# Patient Record
Sex: MALE | Race: BLACK OR AFRICAN AMERICAN | Employment: FULL TIME | URBAN - METROPOLITAN AREA
[De-identification: names, ages, dates, MRNs, and addresses within clinical notes are randomized per-mention and may not be internally consistent; named-entity substitution may affect disease eponyms.]

---

## 2017-11-18 ENCOUNTER — HOSPITAL ENCOUNTER (INPATIENT)
Age: 37
LOS: 9 days | Discharge: HOME | DRG: 286 | End: 2017-11-27
Attending: EMERGENCY MEDICINE | Admitting: INTERNAL MEDICINE
Payer: COMMERCIAL

## 2017-11-18 ENCOUNTER — APPOINTMENT (OUTPATIENT)
Dept: GENERAL RADIOLOGY | Age: 37
DRG: 286 | End: 2017-11-18
Attending: EMERGENCY MEDICINE
Payer: COMMERCIAL

## 2017-11-18 ENCOUNTER — APPOINTMENT (OUTPATIENT)
Dept: NUCLEAR MEDICINE | Age: 37
DRG: 286 | End: 2017-11-18
Attending: EMERGENCY MEDICINE
Payer: COMMERCIAL

## 2017-11-18 DIAGNOSIS — I50.9 ACUTE ON CHRONIC CONGESTIVE HEART FAILURE, UNSPECIFIED CONGESTIVE HEART FAILURE TYPE: Primary | ICD-10-CM

## 2017-11-18 PROBLEM — G47.30 SLEEP APNEA: Status: ACTIVE | Noted: 2017-11-18

## 2017-11-18 PROBLEM — R09.02 HYPOXIA: Status: ACTIVE | Noted: 2017-11-18

## 2017-11-18 PROBLEM — I10 HTN (HYPERTENSION), BENIGN: Status: ACTIVE | Noted: 2017-11-18

## 2017-11-18 PROBLEM — E78.5 HYPERLIPIDEMIA: Status: ACTIVE | Noted: 2017-11-18

## 2017-11-18 PROBLEM — R06.02 SOB (SHORTNESS OF BREATH): Status: ACTIVE | Noted: 2017-11-18

## 2017-11-18 PROBLEM — E11.29 TYPE 2 DIABETES MELLITUS WITH RENAL COMPLICATION (HCC): Status: ACTIVE | Noted: 2017-11-18

## 2017-11-18 PROBLEM — R60.0 BILATERAL LOWER EXTREMITY EDEMA: Status: ACTIVE | Noted: 2017-11-18

## 2017-11-18 LAB
ALBUMIN SERPL-MCNC: 2.8 G/DL (ref 3.5–5)
ALBUMIN/GLOB SERPL: 0.5 {RATIO} (ref 1.1–2.2)
ALP SERPL-CCNC: 82 U/L (ref 45–117)
ALT SERPL-CCNC: 15 U/L (ref 12–78)
AMPHET UR QL SCN: NEGATIVE
ANION GAP SERPL CALC-SCNC: 11 MMOL/L (ref 5–15)
AST SERPL-CCNC: 15 U/L (ref 15–37)
BARBITURATES UR QL SCN: NEGATIVE
BASOPHILS # BLD: 0 K/UL (ref 0–0.1)
BASOPHILS NFR BLD: 0 % (ref 0–1)
BENZODIAZ UR QL: NEGATIVE
BILIRUB SERPL-MCNC: 0.3 MG/DL (ref 0.2–1)
BNP SERPL-MCNC: 1867 PG/ML (ref 0–125)
BUN SERPL-MCNC: 76 MG/DL (ref 6–20)
BUN/CREAT SERPL: 37 (ref 12–20)
CALCIUM SERPL-MCNC: 8.9 MG/DL (ref 8.5–10.1)
CANNABINOIDS UR QL SCN: NEGATIVE
CHLORIDE SERPL-SCNC: 92 MMOL/L (ref 97–108)
CO2 SERPL-SCNC: 30 MMOL/L (ref 21–32)
COCAINE UR QL SCN: NEGATIVE
CREAT SERPL-MCNC: 2.08 MG/DL (ref 0.7–1.3)
D DIMER PPP FEU-MCNC: 0.73 MG/L FEU (ref 0–0.65)
DIFFERENTIAL METHOD BLD: NORMAL
DRUG SCRN COMMENT,DRGCM: NORMAL
EOSINOPHIL # BLD: 0.1 K/UL (ref 0–0.4)
EOSINOPHIL NFR BLD: 1 % (ref 0–7)
ERYTHROCYTE [DISTWIDTH] IN BLOOD BY AUTOMATED COUNT: 13.6 % (ref 11.5–14.5)
EST. AVERAGE GLUCOSE BLD GHB EST-MCNC: 278 MG/DL
GLOBULIN SER CALC-MCNC: 5.5 G/DL (ref 2–4)
GLUCOSE BLD STRIP.AUTO-MCNC: 178 MG/DL (ref 65–100)
GLUCOSE SERPL-MCNC: 384 MG/DL (ref 65–100)
HBA1C MFR BLD: 11.3 % (ref 4.2–6.3)
HCT VFR BLD AUTO: 40.1 % (ref 36.6–50.3)
HGB BLD-MCNC: 13.2 G/DL (ref 12.1–17)
LYMPHOCYTES # BLD: 1.8 K/UL (ref 0.8–3.5)
LYMPHOCYTES NFR BLD: 17 % (ref 12–49)
MCH RBC QN AUTO: 27.6 PG (ref 26–34)
MCHC RBC AUTO-ENTMCNC: 32.9 G/DL (ref 30–36.5)
MCV RBC AUTO: 83.9 FL (ref 80–99)
METHADONE UR QL: NEGATIVE
MONOCYTES # BLD: 0.7 K/UL (ref 0–1)
MONOCYTES NFR BLD: 7 % (ref 5–13)
NEUTS SEG # BLD: 8 K/UL (ref 1.8–8)
NEUTS SEG NFR BLD: 75 % (ref 32–75)
NRBC # BLD: 0.11 K/UL (ref 0–0.01)
NRBC BLD-RTO: 1.1 PER 100 WBC
OPIATES UR QL: NEGATIVE
PCP UR QL: NEGATIVE
PLATELET # BLD AUTO: 320 K/UL (ref 150–400)
POTASSIUM SERPL-SCNC: 5 MMOL/L (ref 3.5–5.1)
PROT SERPL-MCNC: 8.3 G/DL (ref 6.4–8.2)
RBC # BLD AUTO: 4.78 M/UL (ref 4.1–5.7)
RBC MORPH BLD: NORMAL
SERVICE CMNT-IMP: ABNORMAL
SODIUM SERPL-SCNC: 133 MMOL/L (ref 136–145)
TROPONIN I SERPL-MCNC: <0.04 NG/ML
TROPONIN I SERPL-MCNC: <0.04 NG/ML
WBC # BLD AUTO: 10.6 K/UL (ref 4.1–11.1)
WBC NRBC COR # BLD: ABNORMAL 10*3/UL

## 2017-11-18 PROCEDURE — 96374 THER/PROPH/DIAG INJ IV PUSH: CPT

## 2017-11-18 PROCEDURE — 80307 DRUG TEST PRSMV CHEM ANLYZR: CPT | Performed by: EMERGENCY MEDICINE

## 2017-11-18 PROCEDURE — 85025 COMPLETE CBC W/AUTO DIFF WBC: CPT | Performed by: EMERGENCY MEDICINE

## 2017-11-18 PROCEDURE — A9567 TECHNETIUM TC-99M AEROSOL: HCPCS

## 2017-11-18 PROCEDURE — 36415 COLL VENOUS BLD VENIPUNCTURE: CPT | Performed by: INTERNAL MEDICINE

## 2017-11-18 PROCEDURE — 74011250636 HC RX REV CODE- 250/636: Performed by: INTERNAL MEDICINE

## 2017-11-18 PROCEDURE — 83880 ASSAY OF NATRIURETIC PEPTIDE: CPT | Performed by: EMERGENCY MEDICINE

## 2017-11-18 PROCEDURE — 83036 HEMOGLOBIN GLYCOSYLATED A1C: CPT | Performed by: INTERNAL MEDICINE

## 2017-11-18 PROCEDURE — 74011636637 HC RX REV CODE- 636/637: Performed by: INTERNAL MEDICINE

## 2017-11-18 PROCEDURE — 99285 EMERGENCY DEPT VISIT HI MDM: CPT

## 2017-11-18 PROCEDURE — 84484 ASSAY OF TROPONIN QUANT: CPT | Performed by: EMERGENCY MEDICINE

## 2017-11-18 PROCEDURE — 82962 GLUCOSE BLOOD TEST: CPT

## 2017-11-18 PROCEDURE — 85379 FIBRIN DEGRADATION QUANT: CPT | Performed by: EMERGENCY MEDICINE

## 2017-11-18 PROCEDURE — 82803 BLOOD GASES ANY COMBINATION: CPT | Performed by: EMERGENCY MEDICINE

## 2017-11-18 PROCEDURE — 36600 WITHDRAWAL OF ARTERIAL BLOOD: CPT | Performed by: EMERGENCY MEDICINE

## 2017-11-18 PROCEDURE — 65660000000 HC RM CCU STEPDOWN

## 2017-11-18 PROCEDURE — 80053 COMPREHEN METABOLIC PANEL: CPT | Performed by: EMERGENCY MEDICINE

## 2017-11-18 PROCEDURE — 74011000250 HC RX REV CODE- 250: Performed by: INTERNAL MEDICINE

## 2017-11-18 PROCEDURE — 93005 ELECTROCARDIOGRAM TRACING: CPT

## 2017-11-18 PROCEDURE — 71010 XR CHEST PORT: CPT

## 2017-11-18 PROCEDURE — 74011250637 HC RX REV CODE- 250/637: Performed by: INTERNAL MEDICINE

## 2017-11-18 PROCEDURE — 74011250636 HC RX REV CODE- 250/636: Performed by: EMERGENCY MEDICINE

## 2017-11-18 RX ORDER — FUROSEMIDE 40 MG/1
40 TABLET ORAL 2 TIMES DAILY
COMMUNITY

## 2017-11-18 RX ORDER — DEXTROSE 50 % IN WATER (D50W) INTRAVENOUS SYRINGE
12.5-25 AS NEEDED
Status: DISCONTINUED | OUTPATIENT
Start: 2017-11-18 | End: 2017-11-27 | Stop reason: HOSPADM

## 2017-11-18 RX ORDER — NALOXONE HYDROCHLORIDE 0.4 MG/ML
0.4 INJECTION, SOLUTION INTRAMUSCULAR; INTRAVENOUS; SUBCUTANEOUS AS NEEDED
Status: DISCONTINUED | OUTPATIENT
Start: 2017-11-18 | End: 2017-11-27 | Stop reason: HOSPADM

## 2017-11-18 RX ORDER — LORAZEPAM 1 MG/1
1 TABLET ORAL
Status: DISCONTINUED | OUTPATIENT
Start: 2017-11-18 | End: 2017-11-27 | Stop reason: HOSPADM

## 2017-11-18 RX ORDER — GEMFIBROZIL 600 MG/1
600 TABLET, FILM COATED ORAL 2 TIMES DAILY
COMMUNITY

## 2017-11-18 RX ORDER — SODIUM CHLORIDE 0.9 % (FLUSH) 0.9 %
5-10 SYRINGE (ML) INJECTION EVERY 8 HOURS
Status: DISCONTINUED | OUTPATIENT
Start: 2017-11-18 | End: 2017-11-27 | Stop reason: HOSPADM

## 2017-11-18 RX ORDER — HEPARIN SODIUM 5000 [USP'U]/ML
5000 INJECTION, SOLUTION INTRAVENOUS; SUBCUTANEOUS EVERY 8 HOURS
Status: DISCONTINUED | OUTPATIENT
Start: 2017-11-18 | End: 2017-11-20

## 2017-11-18 RX ORDER — ASPIRIN 81 MG/1
81 TABLET ORAL DAILY
Status: DISCONTINUED | OUTPATIENT
Start: 2017-11-19 | End: 2017-11-27 | Stop reason: HOSPADM

## 2017-11-18 RX ORDER — BUMETANIDE 0.25 MG/ML
1 INJECTION INTRAMUSCULAR; INTRAVENOUS 2 TIMES DAILY
Status: DISCONTINUED | OUTPATIENT
Start: 2017-11-18 | End: 2017-11-21

## 2017-11-18 RX ORDER — SODIUM CHLORIDE 0.9 % (FLUSH) 0.9 %
5-10 SYRINGE (ML) INJECTION AS NEEDED
Status: DISCONTINUED | OUTPATIENT
Start: 2017-11-18 | End: 2017-11-27 | Stop reason: HOSPADM

## 2017-11-18 RX ORDER — DILTIAZEM HYDROCHLORIDE 240 MG/1
240 CAPSULE, COATED, EXTENDED RELEASE ORAL DAILY
Status: DISCONTINUED | OUTPATIENT
Start: 2017-11-19 | End: 2017-11-27

## 2017-11-18 RX ORDER — ENOXAPARIN SODIUM 100 MG/ML
40 INJECTION SUBCUTANEOUS EVERY 24 HOURS
Status: CANCELLED | OUTPATIENT
Start: 2017-11-18

## 2017-11-18 RX ORDER — INSULIN GLARGINE 100 [IU]/ML
15 INJECTION, SOLUTION SUBCUTANEOUS
Status: DISCONTINUED | OUTPATIENT
Start: 2017-11-18 | End: 2017-11-19

## 2017-11-18 RX ORDER — AMLODIPINE BESYLATE 10 MG/1
10 TABLET ORAL DAILY
COMMUNITY

## 2017-11-18 RX ORDER — INSULIN LISPRO 100 [IU]/ML
INJECTION, SOLUTION INTRAVENOUS; SUBCUTANEOUS
Status: DISCONTINUED | OUTPATIENT
Start: 2017-11-18 | End: 2017-11-27 | Stop reason: HOSPADM

## 2017-11-18 RX ORDER — ASPIRIN 81 MG/1
81 TABLET ORAL DAILY
COMMUNITY

## 2017-11-18 RX ORDER — PROCHLORPERAZINE EDISYLATE 5 MG/ML
5 INJECTION INTRAMUSCULAR; INTRAVENOUS
Status: DISCONTINUED | OUTPATIENT
Start: 2017-11-18 | End: 2017-11-27 | Stop reason: HOSPADM

## 2017-11-18 RX ORDER — DILTIAZEM HYDROCHLORIDE 240 MG/1
240 CAPSULE, COATED, EXTENDED RELEASE ORAL DAILY
COMMUNITY

## 2017-11-18 RX ORDER — MORPHINE SULFATE 2 MG/ML
2 INJECTION, SOLUTION INTRAMUSCULAR; INTRAVENOUS
Status: DISCONTINUED | OUTPATIENT
Start: 2017-11-18 | End: 2017-11-27 | Stop reason: HOSPADM

## 2017-11-18 RX ORDER — DIPHENHYDRAMINE HYDROCHLORIDE 50 MG/ML
12.5 INJECTION, SOLUTION INTRAMUSCULAR; INTRAVENOUS
Status: DISCONTINUED | OUTPATIENT
Start: 2017-11-18 | End: 2017-11-27 | Stop reason: HOSPADM

## 2017-11-18 RX ORDER — FUROSEMIDE 10 MG/ML
80 INJECTION INTRAMUSCULAR; INTRAVENOUS
Status: COMPLETED | OUTPATIENT
Start: 2017-11-18 | End: 2017-11-18

## 2017-11-18 RX ORDER — METOPROLOL SUCCINATE 50 MG/1
50 TABLET, EXTENDED RELEASE ORAL DAILY
COMMUNITY

## 2017-11-18 RX ORDER — MAGNESIUM SULFATE 100 %
4 CRYSTALS MISCELLANEOUS AS NEEDED
Status: DISCONTINUED | OUTPATIENT
Start: 2017-11-18 | End: 2017-11-27 | Stop reason: HOSPADM

## 2017-11-18 RX ORDER — METOPROLOL SUCCINATE 50 MG/1
50 TABLET, EXTENDED RELEASE ORAL DAILY
Status: DISCONTINUED | OUTPATIENT
Start: 2017-11-18 | End: 2017-11-27 | Stop reason: HOSPADM

## 2017-11-18 RX ORDER — ACETAMINOPHEN 325 MG/1
650 TABLET ORAL
Status: DISCONTINUED | OUTPATIENT
Start: 2017-11-18 | End: 2017-11-27 | Stop reason: HOSPADM

## 2017-11-18 RX ORDER — METFORMIN HYDROCHLORIDE 1000 MG/1
1000 TABLET ORAL 2 TIMES DAILY WITH MEALS
COMMUNITY

## 2017-11-18 RX ORDER — GEMFIBROZIL 600 MG/1
600 TABLET, FILM COATED ORAL 2 TIMES DAILY
Status: DISCONTINUED | OUTPATIENT
Start: 2017-11-18 | End: 2017-11-27 | Stop reason: HOSPADM

## 2017-11-18 RX ADMIN — BUMETANIDE 1 MG: 0.25 INJECTION INTRAMUSCULAR; INTRAVENOUS at 20:41

## 2017-11-18 RX ADMIN — FUROSEMIDE 80 MG: 10 INJECTION, SOLUTION INTRAMUSCULAR; INTRAVENOUS at 14:31

## 2017-11-18 RX ADMIN — GEMFIBROZIL 600 MG: 600 TABLET ORAL at 20:42

## 2017-11-18 RX ADMIN — METOPROLOL SUCCINATE 50 MG: 50 TABLET, FILM COATED, EXTENDED RELEASE ORAL at 20:41

## 2017-11-18 RX ADMIN — INSULIN GLARGINE 15 UNITS: 100 INJECTION, SOLUTION SUBCUTANEOUS at 21:27

## 2017-11-18 RX ADMIN — HEPARIN SODIUM 5000 UNITS: 5000 INJECTION, SOLUTION INTRAVENOUS; SUBCUTANEOUS at 20:41

## 2017-11-18 NOTE — ED PROVIDER NOTES
HPI Comments: 44 y.o. male with past medical history significant for CHF, DM, and HTN who presents to the ED with chief complaint of SOB. Pt reports he started with cough, rhinorrhea, and fever 4 days ago and today he began having SOB. Pt's O2 sats are noted to be 87% on 6L O2 in the ED. Pt states he has had bilateral ankle swelling and some abdominal distension. Pt states he also had orthopnea last night. Pt states he takes Lasix BID and is compliant with his medication. Denies wearing O2 at home. Pt states he is visiting from [place], says he had about a 6 hour train ride to get here. Pt denies hx of DVT/PE, COPD, emphysema, or MI. Pt denies taking any anticoagulants. Pt denies urinary sx, leg pain, or chest pain. There are no other acute medical complaints voiced at this time. Social Hx: Occasional EtOH use. Denies smoking tobacco or drug use. From [place]. PCP: No primary care provider on file. Note written by Barbara Nguyen, as dictated by Marco Fernando MD 2:08 PM     The history is provided by the patient. No past medical history on file. No past surgical history on file. No family history on file. Social History     Social History    Marital status: N/A     Spouse name: N/A    Number of children: N/A    Years of education: N/A     Occupational History    Not on file. Social History Main Topics    Smoking status: Not on file    Smokeless tobacco: Not on file    Alcohol use Not on file    Drug use: Not on file    Sexual activity: Not on file     Other Topics Concern    Not on file     Social History Narrative         ALLERGIES: Review of patient's allergies indicates no known allergies. Review of Systems   Constitutional: Positive for fever. Negative for activity change and chills. HENT: Positive for rhinorrhea. Negative for nosebleeds, sore throat, trouble swallowing and voice change. Eyes: Negative for visual disturbance.    Respiratory: Positive for cough and shortness of breath. Cardiovascular: Positive for leg swelling. Negative for chest pain and palpitations. Gastrointestinal: Positive for abdominal distention. Negative for abdominal pain, constipation, diarrhea and nausea. Genitourinary: Negative for decreased urine volume, difficulty urinating, dysuria, frequency, hematuria and urgency. Musculoskeletal: Negative for back pain, neck pain and neck stiffness. Skin: Negative for color change. Allergic/Immunologic: Negative for immunocompromised state. Neurological: Negative for dizziness, seizures, syncope, weakness, light-headedness, numbness and headaches. Psychiatric/Behavioral: Negative for behavioral problems, confusion, hallucinations, self-injury and suicidal ideas. All other systems reviewed and are negative. Vitals:    11/18/17 1351   BP: 127/67   Pulse: (!) 128   Resp: 28   SpO2: (!) 58%            Physical Exam   Constitutional: He is oriented to person, place, and time. He appears well-developed and well-nourished. No distress. HENT:   Head: Normocephalic and atraumatic. Eyes: Pupils are equal, round, and reactive to light. Neck: Normal range of motion. Neck supple. Cardiovascular: Regular rhythm and normal heart sounds. Tachycardia present. Exam reveals no gallop and no friction rub. No murmur heard. Pulmonary/Chest:   Lung sounds absent at bases. Fluid filled at mid lung zones. Clear at apices. Abdominal: Bowel sounds are normal. He exhibits distension. There is no tenderness. There is no rebound and no guarding. Abdomen firm. Musculoskeletal: Normal range of motion. Neurological: He is alert and oriented to person, place, and time. Skin: Skin is warm. No rash noted. He is not diaphoretic. Psychiatric: He has a normal mood and affect. His behavior is normal. Judgment and thought content normal.   Nursing note and vitals reviewed.      Note written by Barbara Recinos as dictated by Brenda Schmidt MD 2:09 PM     [facility]  ED Course   This is a 26-year-old male with past medical history, review of systems, physical exam as above, presenting with complaints of dyspnea, cough him or rhinorrhea, patient noted in triage to be hypoxic with room air saturations in the 60s. Patient moved back to the treatment area urgently, provided supple no oxygen via nasal cannula, with sats improved into the period patient ultimately required 15 L by nonrebreather for saturations greater than 90%. He endorses a history of CHF, states compliance with diuretics, does not know his last ejection fraction, does endorse recent worsening pedal edema, and abdominal distention. Physical exam is remarkable for a well-appearing male, remarkable given his respiratory status, with mesh breath sounds at bases, congestion breath sounds in the mid lung sounds, and mild wheezing at the apices. Patient denies a history of coronary artery disease, states he does not know the nature of his congestive heart failure, denies previous history of blood clots, though has recently traveled several hours. Will start diuresis, obtain CMP, CBC, chest x-ray, d-dimer, cardiac enzymes, EKG, and likely admit the patient for further care and evaluation. Procedures    ED EKG interpretation:  Rhythm: sinus tachycardia; and regular . Rate (approx.): 125; possible left atrial enlargement; ST/T wave: normal.      Note written by Barbara Quan, as dictated by Brenda Schmidt MD 2:03 PM    CONSULT NOTE:  3:30 PM Brenda Schmidt MD spoke with Dr. Guillaume Reyes, Consult for Hospitalist.  Discussed available diagnostic tests and clinical findings. He is in agreement with care plans as outlined. Dr. Guillaume Reyes will admit pt.

## 2017-11-18 NOTE — IP AVS SNAPSHOT
Summary of Care Report The Summary of Care report has been created to help improve care coordination. Users with access to [employer] or [address] (Web-based application) may access additional patient information including the Discharge Summary. If you are not currently a [address] user and need more information, please call the number listed below in the [place] [address] section and ask to be connected with Medical Records. Facility Information Name Address Phone [address] [id number] [phone] Patient Information Patient Name Sex  Rodger Valles ([id number]) Male 1980 Discharge Information Admitting Provider Service Area Unit Yessenia Michel MD / [facility] [address] [facility] / [phone] Discharge Provider Discharge Date/Time Discharge Disposition Destination (none) 2017 (Pending) AHR (none) Patient Language Language ENGLISH [13] Hospital Problems as of 2017  Never Reviewed Class Noted - Resolved Last Modified POA Active Problems SOB (shortness of breath)  2017 - Present 2017 by Yessenia Michel MD Yes Entered by Yessenia Michel MD  
  Type 2 diabetes mellitus with renal complication ([facility] [address]   - Present 2017 by Yessenia Michel MD Yes Entered by Yessenia Michel MD  
  HTN (hypertension), benign  2017 - Present 2017 by Yessenia Michel MD Yes Entered by Yessenia Michel MD  
  Hyperlipidemia  2017 - Present 2017 by Yessenia Michel MD Yes Entered by Yessenia Michel MD  
  Bilateral lower extremity edema  2017 - Present 2017 by Yessenia Michel MD Yes Entered by Yessenia Michel MD  
  Hypoxia  2017 - Present 2017 by Yessenia Michel MD Yes Entered by Guy So MD  
  Sleep apnea  11/18/2017 - Present 11/18/2017 by Guy So MD Yes Entered by Guy So MD  
  CHF with unknown LVEF ([address]  11/18/2017 - Present 11/18/2017 by Guy So MD Yes Entered by Guy So MD  
  Moderate to severe pulmonary hypertension  11/23/2017 - Present 11/23/2017 by Aristeo Ramirez MD Yes Entered by Aristeo Ramirez MD  
  
You are allergic to the following No active allergies Current Discharge Medication List  
  
START taking these medications Dose & Instructions Dispensing Information Comments  
 insulin glargine 100 unit/mL injection Commonly known as:  LANTUS Dose:  15 Units 15 Units by SubCUTAneous route daily. Quantity:  1 Vial  
Refills:  1 CONTINUE these medications which have NOT CHANGED Dose & Instructions Dispensing Information Comments  
 amLODIPine 10 mg tablet Commonly known as:  [place] Dose:  10 mg Take 10 mg by mouth daily. Refills:  0  
   
 aspirin delayed-release 81 mg tablet Dose:  81 mg Take 81 mg by mouth daily. Refills:  0  
   
 dilTIAZem  mg ER capsule Commonly known as:  CARDIZEM CD Dose:  240 mg Take 240 mg by mouth daily. Refills:  0  
   
 furosemide 40 mg tablet Commonly known as:  LASIX Dose:  40 mg Take 40 mg by mouth two (2) times a day. Refills:  0  
   
 gemfibrozil 600 mg tablet Commonly known as:  LOPID Dose:  600 mg Take 600 mg by mouth two (2) times a day. Refills:  0  
   
 metFORMIN 1,000 mg tablet Commonly known as:  GLUCOPHAGE Dose:  1000 mg Take 1,000 mg by mouth two (2) times daily (with meals). Refills:  0  
   
 metoprolol succinate 50 mg XL tablet Commonly known as:  TOPROL-XL Dose:  50 mg Take 50 mg by mouth daily. Refills:  0 Follow-up Information Follow up With Details Comments Contact Info Phys Other, MD   Patient can only remember the practice name and not the physician Discharge Instructions Avoiding Triggers With Heart Failure: Care Instructions Your Care Instructions Triggers are anything that make your heart failure flare up. A flare-up is also called \"sudden heart failure\" or \"acute heart failure. \" When you have a flare-up, fluid builds up in your lungs, and you have problems breathing. You might need to go to the hospital. By watching for changes in your condition and avoiding triggers, you can prevent heart failure flare-ups. Follow-up care is a key part of your treatment and safety. Be sure to make and go to all appointments, and call your doctor if you are having problems. It's also a good idea to know your test results and keep a list of the medicines you take. How can you care for yourself at home? Watch for changes in your weight and condition · Weigh yourself without clothing at the same time each day. Record your weight. Call your doctor if you have sudden weight gain, such as more than 2 to 3 pounds in a day or 5 pounds in a week. (Your doctor may suggest a different range of weight gain.) A sudden weight gain may mean that your heart failure is getting worse. · Keep a daily record of your symptoms. Write down any changes in how you feel, such as new shortness of breath, cough, or problems eating. Also record if your ankles are more swollen than usual and if you feel more tired than usual. Note anything that you ate or did that could have triggered these changes. Limit sodium Sodium causes your body to hold on to extra water. This may cause your heart failure symptoms to get worse. People get most of their sodium from processed foods. Fast food and restaurant meals also tend to be very high in sodium. · Your doctor may suggest that you limit sodium to 2,000 milligrams (mg) a day or less.  That is less than 1 teaspoon of salt a day, including all the salt you eat in cooking or in packaged foods. · Read food labels on cans and food packages. They tell you how much sodium you get in one serving. Check the serving size. If you eat more than one serving, you are getting more sodium. · Be aware that sodium can come in forms other than salt, including monosodium glutamate (MSG), sodium citrate, and sodium bicarbonate (baking soda). MSG is often added to Asian food. You can sometimes ask for food without MSG or salt. · Slowly reducing salt will help you adjust to the taste. Take the salt shaker off the table. · Flavor your food with garlic, lemon juice, onion, vinegar, herbs, and spices instead of salt. Do not use soy sauce, steak sauce, onion salt, garlic salt, mustard, or ketchup on your food, unless it is labeled \"low-sodium\" or \"low-salt. \" 
· Make your own salad dressings, sauces, and ketchup without adding salt. · Use fresh or frozen ingredients, instead of canned ones, whenever you can. Choose low-sodium canned goods. · Eat less processed food and food from restaurants, including fast food. Exercise as directed Moderate, regular exercise is very good for your heart. It improves your blood flow and helps control your weight. But too much exercise can stress your heart and cause a heart failure flare-up. · Check with your doctor before you start an exercise program. 
· Walking is an easy way to get exercise. Start out slowly. Gradually increase the length and pace of your walk. Swimming, riding a bike, and using a treadmill are also good forms of exercise. · When you exercise, watch for signs that your heart is working too hard. You are pushing yourself too hard if you cannot talk while you are exercising. If you become short of breath or dizzy or have chest pain, stop, sit down, and rest. 
· Do not exercise when you do not feel well. Take medicines correctly · Take your medicines exactly as prescribed.  Call your doctor if you think you are having a problem with your medicine. · Make a list of all the medicines you take. Include those prescribed to you by other doctors and any over-the-counter medicines, vitamins, or supplements you take. Take this list with you when you go to any doctor. · Take your medicines at the same time every day. It may help you to post a list of all the medicines you take every day and what time of day you take them. · Make taking your medicine as simple as you can. Plan times to take your medicines when you are doing other things, such as eating a meal or getting ready for bed. This will make it easier to remember to take your medicines. · Get organized. Use helpful tools, such as daily or weekly pill containers. When should you call for help? Call 911 if you have symptoms of sudden heart failure such as: 
? · You have severe trouble breathing. ? · You cough up pink, foamy mucus. ? · You have a new irregular or rapid heartbeat. ?Call your doctor now or seek immediate medical care if: 
? · You have new or increased shortness of breath. ? · You are dizzy or lightheaded, or you feel like you may faint. ? · You have sudden weight gain, such as more than 2 to 3 pounds in a day or 5 pounds in a week. (Your doctor may suggest a different range of weight gain.) ? · You have increased swelling in your legs, ankles, or feet. ? · You are suddenly so tired or weak that you cannot do your usual activities. ? Watch closely for changes in your health, and be sure to contact your doctor if you develop new symptoms. Where can you learn more? Go to http://cristobal-jeanette.info/. Enter S524 in the search box to learn more about \"Avoiding Triggers With Heart Failure: Care Instructions. \" Current as of: September 21, 2016 Content Version: 11.4 © 7[address]38 [address] [employer].  Care instructions adapted under license by [employer] (which disclaims liability or warranty for this information). If you have questions about a medical condition or this instruction, always ask your healthcare professional. [address] any warranty or liability for your use of this information. Chronic Pulmonary Heart Disease: Care Instructions Your Care Instructions Your heart has four chambers. The lower right chamber, called the right ventricle, pumps blood to the lungs. Chronic pulmonary heart disease happens when the right ventricle has to work too hard to pump blood to lungs that have been damaged. The lungs may have been damaged by a condition like COPD (chronic obstructive pulmonary disease), blood clots in the lung, or sleep apnea. As time goes by, the right ventricle will get weaker and begin to fail. Over time, chronic pulmonary heart disease can cause fluid to build up in your body. This buildup can cause fatigue, swelling in the legs and body, and other problems. The damage to your lungs may cause shortness of breath. You can take steps to feel better and live longer with this disease. These steps include taking medicines regularly, getting oxygen therapy if your doctor recommends it, and making lifestyle changes. Follow-up care is a key part of your treatment and safety. Be sure to make and go to all appointments, and call your doctor if you are having problems. It's also a good idea to know your test results and keep a list of the medicines you take. How can you care for yourself at home? Medicine · Your doctor may prescribe oxygen therapy to reduce your heart's workload and help you breathe easier. · Be safe with medicines. Take your medicines exactly as prescribed. Call your doctor if you think you are having a problem with your medicine. · Talk to your doctor before you take any vitamins, over-the-counter medicine, or herbal products.  Don't take ibuprofen (Advil or Motrin) and naproxen (Aleve) without talking to your doctor first. They could make your heart problem worse. Weight · Weigh yourself without clothing at the same time each day. A sudden weight gain may mean that your heart problem is getting worse. Activity level · If your doctor recommends it, get more exercise. Walking is a good choice. Bit by bit, increase the amount you walk every day. Try for at least 30 minutes on most days of the week. · Watch for signs that your heart is working too hard when you exercise. You are pushing yourself too hard if you cannot talk while you are exercising. If you become short of breath or dizzy or have chest pain, stop, sit down, and rest. 
Lifestyle changes · Do not smoke. Smoking can make heart disease worse. If you need help quitting, talk to your doctor about stop-smoking programs and medicines. These can increase your chances of quitting for good. Quitting smoking may be the most important step you can take to protect your heart. · Limit alcohol to 2 drinks a day for men and 1 drink a day for women. Too much alcohol can cause health problems. · Avoid colds and flu. Get a pneumococcal vaccine shot. If you have had one before, ask your doctor if you need a second dose. Get a flu vaccine every fall. If you must be around people with colds or flu, wash your hands often. When should you call for help? Call 911 anytime you think you may need emergency care. For example, call if: 
? · You have symptoms of sudden heart failure. These may include: ¨ Severe trouble breathing. ¨ A fast or irregular heartbeat. ¨ Coughing up pink, foamy mucus. ? · You passed out (lost consciousness). ?Call your doctor now or seek immediate medical care if: 
? · You have new or worse trouble breathing. ? · You are dizzy or lightheaded, or you feel like you may faint. ? · You have sudden weight gain, such as more than 2 to 3 pounds in a day or 5 pounds in a week. (Your doctor may suggest a different range of weight gain.) ?Watch closely for changes in your health, and be sure to contact your doctor if: 
? · You have new or worse swelling in your belly, legs, ankles, or feet. ? · You do not get better as expected. Where can you learn more? Go to http://cristobal-jeanette.info/. Enter R303 in the search box to learn more about \"Chronic Pulmonary Heart Disease: Care Instructions. \" Current as of: September 21, 2016 Content Version: 11.4 © 1593-3250 [employer]. Care instructions adapted under license by [employer] (which disclaims liability or warranty for this information). If you have questions about a medical condition or this instruction, always ask your healthcare professional. [address] any warranty or liability for your use of this information. Chart Review Routing History No Routing History on File

## 2017-11-18 NOTE — ED TRIAGE NOTES
Pt here with cough, subjective fever, and mild body aches x 2 days. Pt sats are 58% in triage. Pt taken to room 8 for immediate treatment.

## 2017-11-18 NOTE — PROGRESS NOTES
[facility]: MED RECONCILIATION    Comments/Recommendations:   Patient is awake, alert, and knowledgeable about home medications   Reports compliance to prescribed regimen  The patient has not taken his metoprolol and diltiazem in about 4 days as he ran out of medications and does not have any refills. The patient needs to see his provider before he can obtain more refills. 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 At baseline the patient reports his average BP and HR to be 125/78, 100s. Medication reconciliation completed with patients own medications at the patients bedside  · Counseled the patient to:  · Not use his own medication while in the hospital unless otherwise instructed  · Have a family member take the medication home as soon as possible  · If medication cannot be taken home, request that the patient notify the nurse so the medication may be locked up according to hospital policy      Allergies: Review of patient's allergies indicates no known allergies. Prior to Admission Medications:     Medication Documentation Review Audit       Reviewed by Yeny Morgan PHARMD (Pharmacist) on 11/18/17 at 1750         Medication Sig Documenting Provider Last Dose Status Taking? amLODIPine (NORVASC) 10 mg tablet Take 10 mg by mouth daily. Historical Provider 11/18/2017 Unknown time Active Yes    aspirin delayed-release 81 mg tablet Take 81 mg by mouth daily. Historical Provider 11/18/2017 Unknown time Active Yes    dilTIAZem CD (CARDIZEM CD) 240 mg ER capsule Take 240 mg by mouth daily. Historical Provider 11/15/2017 Unknown time Active Yes    furosemide (LASIX) 40 mg tablet Take 40 mg by mouth two (2) times a day. Historical Provider 11/18/2017 am Active Yes    gemfibrozil (LOPID) 600 mg tablet Take 600 mg by mouth two (2) times a day. Historical Provider 11/18/2017 am Active Yes    metFORMIN (GLUCOPHAGE) 1,000 mg tablet Take 1,000 mg by mouth two (2) times daily (with meals). Historical Provider 11/18/2017 Unknown time Active Yes    metoprolol succinate (TOPROL-XL) 50 mg XL tablet Take 50 mg by mouth daily.  Historical Provider 11/15/2017 Active Yes                  Thank you,      Ayo Meneses, PharmD, BCPS

## 2017-11-18 NOTE — ED NOTES
TRANSFER - OUT REPORT:    Verbal report given to Sheree Pisano on Wenda Stade  being transferred to 3rd Floor(unit) for routine progression of care       Report consisted of patients Situation, Background, Assessment and   Recommendations(SBAR). Information from the following report(s) SBAR, ED Summary, Intake/Output and Med Rec Status was reviewed with the receiving nurse. Lines:   Peripheral IV 11/18/17 Right Hand (Active)   Site Assessment Clean, dry, & intact 11/18/2017  2:09 PM   Phlebitis Assessment 0 11/18/2017  2:09 PM   Infiltration Assessment 0 11/18/2017  2:09 PM   Dressing Status Clean, dry, & intact; New 11/18/2017  2:09 PM   Hub Color/Line Status Green 11/18/2017  2:09 PM        Opportunity for questions and clarification was provided.       Patient transported with:   O2 @ 9 liters

## 2017-11-18 NOTE — H&P
[address] [address]  [phone]    Admission History and Physical      NAME:              Raul Engel   :   1980   MRN:  [id number]     PCP:  Tony Darke MD     Date:     2017     Chief  Complaint: SOB    History Of Presenting Illness:       Mr. Micaela Gabriel is a 44 y.o. male who is being admitted for dyspnea and hypoxia. Mr. Micaela Gabriel presented to our Emergency Department today complaining of moderately severe SOB, at rest, worse with any exertion and associated with orthopnea and a bilateral lower extremity swelling. He denies any cough or chest discomfort. He is here visiting from [place] where he had been treated for CHF of unknown etiology, HTN and DM. At the ED, he was found to be very dyspneic and hypoxic. A chest Xray did not show any fluid overload. EKG showed sinus tachycardia. He will be admitted for further management. No Known Allergies     Prior to Admission medications    Medication Sig Start Date End Date Taking? Authorizing Provider   metoprolol succinate (TOPROL-XL) 50 mg XL tablet Take 50 mg by mouth daily. Yes Historical Provider   amLODIPine (NORVASC) 10 mg tablet Take 10 mg by mouth daily. Yes Historical Provider   gemfibrozil (LOPID) 600 mg tablet Take 600 mg by mouth two (2) times a day. Yes Historical Provider   metFORMIN (GLUCOPHAGE) 1,000 mg tablet Take 1,000 mg by mouth two (2) times daily (with meals). Yes Historical Provider   dilTIAZem CD (CARDIZEM CD) 240 mg ER capsule Take 240 mg by mouth daily. Yes Historical Provider   furosemide (LASIX) 40 mg tablet Take 40 mg by mouth two (2) times a day. Yes Historical Provider   aspirin delayed-release 81 mg tablet Take 81 mg by mouth daily.    Yes Historical Provider       Past Medical History:   Diagnosis Date    CHF (congestive heart failure) ([facility]     DM type 2 (diabetes mellitus, type 2) ([facility]     HTN (hypertension), benign     Hyperlipidemia     Sleep apnea         No past surgical history on file. Social History   Substance Use Topics    Smoking status: Not on file    Smokeless tobacco: Not on file    Alcohol use Not on file        Family History   Problem Relation Age of Onset    Heart Disease Neg Hx      Review of Systems:    Constitutional ROS: no fever, chills, rigors or night sweats  Respiratory ROS: no cough, sputum, hemoptysis but dyspnea. Cardiovascular ROS: no chest pain, palpitations but orthopnea, PND   Endocrine ROS: no polydispsia, polyuria, heat or cold intolerance or major weight change. Gastrointestinal ROS: no dysphagia, abdominal pain, nausea, vomiting or diarrhea    Genito-Urinary ROS: no dysuria, frequency, hematuria, retention or flank pain  Musculoskeletal ROS: no joint pain but lower extremity swelling  Neurological ROS: no headache, confusion, focal weakness or any other neurological symptoms  Psychiatric ROS: no depression, anxiety, mood swings  Dermatological ROS: no rash, pruritis, or urticaria  Heme-Lymph ROS: no swollen glands, bleeding    Examination:    Constitutional:    Visit Vitals    /74    Pulse (!) 121    Resp 30    Ht 5' 10\" (1.778 m)    Wt 103 kg (227 lb)    SpO2 91%    BMI 32.57 kg/m2         General:  Weak and ill looking patient in some acute distress  Eyes: Pink conjunctivae, PERRLA with no discharge. Normal eye movements  Ear, Nose, Mouth & Throat: No ottorrhea, rhinorrhea, non tender sinuses, dry mucous membranes  Respiratory:  ++accessory muscle use, decreased breath sounds with scattered crackles or wheezes  Cardiovascular: +JVD or murmurs, sinus tachycardia, without thrills, bruits. ++ peripheral edema. GI & :  Soft abdomen, non-tender, non-distended, normoactive bowel sounds with no palpable organomegaly  Heme:  No cervical or axillary adenopathy. Musculoskeletal:  No cyanosis, clubbing, atrophy or deformities  Skin:  No rashes, bruising or ulcers   Neurological: Awake and alert, anxious, speech is clear, CNs 2-12 are grossly intact and otherwise non focal  Psychiatric:  Has a fair insight and is oriented x 3  ________________________________________________________________________    Data Review:    Labs:    Recent Labs      11/18/17   1411   WBC  10.6   HGB  13.2   HCT  40.1   PLT  320     Recent Labs      11/18/17   1411   NA  133*   K  5.0   CL  92*   CO2  30   GLU  384*   BUN  76*   CREA  2.08*   CA  8.9   ALB  2.8*   SGOT  15   ALT  15     No components found for: GLPOC  No results for input(s): PH, PCO2, PO2, HCO3, FIO2 in the last 72 hours. No results for input(s): INR in the last 72 hours. No lab exists for component: INREXT    Radiological Studies:      Personally reviewed Chest X-ray - Bilateral lower lobe atelectasis. Other Medical tests:    Personally reviewed 12 lead EKG - sinus tachycardia, no acute changes     I have also reviewed available old medical records. Assessment & Impression:     Mr. Anna Mckoy is a 44 y.o. male being evaluated for:     Active Problems:    SOB (shortness of breath) (11/18/2017)      Type 2 diabetes mellitus with renal complication ([facility] (89/79/4498)      HTN (hypertension), benign (11/18/2017)      Hyperlipidemia (11/18/2017)      Bilateral lower extremity edema (11/18/2017)      Hypoxia (11/18/2017)      Sleep apnea (11/18/2017)      CHF with unknown LVEF ([facility] (11/18/2017)         Plan of management:    CHF with unknown LVEF ([facility] (11/18/2017): suspected. Has a hx of CHF, now elevated pro BNP and dyspneic. Admit to hospital. Start IV diuresis with bumex, daily weights and closely monitor fluid input, output balance. Get an Echocardiogram, serial troponin and consult cardiology. Patient may NOT want to stay here for long and may want to leave soon.     SOB (shortness of breath) (11/18/2017)/  Hypoxia (11/18/2017)/ Bilateral lower extremity edema (11/18/2017): likely due to CHF. However, he has risk factors for VTE. Get a VQ scan as Cr is elevated. Supplemental oxygen as tolerated    Type 2 diabetes mellitus with renal complication ([facility] (67/51/9935): check an A1c. Monitor blood glucose. SSI and give lantus. Hold oral agents    HTN (hypertension), benign (11/18/2017): resume home medications once verified    Hyperlipidemia (11/18/2017): resume home medications once verified    Sleep apnea (11/18/2017): he says his machine is NOT working. Oxygen as needed.  Out patient with PCP    Code Status:  Full    Surrogate decision maker: Family    Risk of deterioration: high      Total time spent for the care of the patient: [address] discussed with: Patient, Family, Nursing Staff and ED physician    Discussed:  Code Status, Care Plan and D/C Planning    Prophylaxis:  Hep SQ    Probable Disposition:  Home w/Family           ___________________________________________________    Attending Physician: Juany Pina MD

## 2017-11-18 NOTE — IP AVS SNAPSHOT
[address] 
[phone] Patient: Brittany Landin MRN: [id number] My Medications TAKE these medications as instructed Instructions Each Dose to Equal  
 Morning Noon Evening Bedtime  
 amLODIPine 10 mg tablet Commonly known as:  Catalino Gunn Your last dose was:  Monday 11/27 at 8:00 AM  
Your next dose is:  Tuesday 11/28 at 8:00 AM  
   
 Take 10 mg by mouth daily. 10 mg  
    
  
   
   
   
  
 aspirin delayed-release 81 mg tablet Your last dose was:  Monday 11/27 at 8:00 AM  
Your next dose is:  Tuesday 11/28 at 8:00 AM  
   
 Take 81 mg by mouth daily. 81 mg  
    
  
   
   
   
  
 dilTIAZem  mg ER capsule Commonly known as:  CARDIZEM CD Your last dose was:  Monday 11/27 at 8:00 AM  
Your next dose is:  Tuesday 11/28 at 8:00 AM  
   
 Take 240 mg by mouth daily. 240 mg  
    
  
   
   
   
  
 furosemide 40 mg tablet Commonly known as:  LASIX Your last dose was:  Monday 11/27 at 8:00 AM  
Your next dose is:  Tuesday 11/28 at 8:00 AM  
   
 Take 40 mg by mouth two (2) times a day. 40 mg  
    
  
   
   
  
   
  
 gemfibrozil 600 mg tablet Commonly known as:  LOPID Your last dose was:  Monday 11/27 at 8:00 AM  
Your next dose is:  Monday 11/27 at 6:00 PM  
   
 Take 600 mg by mouth two (2) times a day. 600 mg  
    
  
   
   
  
   
  
 insulin glargine 100 unit/mL injection Commonly known as:  LANTUS Your last dose was:  Monday 11/27 at 8:00 AM  
   
 15 Units by SubCUTAneous route daily. 15 Units  
    
  
   
   
   
  
 metFORMIN 1,000 mg tablet Commonly known as:  GLUCOPHAGE Take 1,000 mg by mouth two (2) times daily (with meals). 1000 mg  
    
  
   
   
  
   
  
 metoprolol succinate 50 mg XL tablet Commonly known as:  TOPROL-XL Your last dose was:  Monday 11/27 at 8:00 AM  
   
 Take 50 mg by mouth daily.   
 50 mg  
    
  
   
   
 Where to Get Your Medications Information on where to get these meds will be given to you by the nurse or doctor. ! Ask your nurse or doctor about these medications  
  insulin glargine 100 unit/mL injection

## 2017-11-18 NOTE — IP AVS SNAPSHOT
Diann Bolivar [address] [facility] 
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  
  
Where:  Patient can only remember the practice name and not the physician Discharge Orders None A check aysha indicates which time of day the medication should be taken. My Medications TAKE these medications as instructed Instructions Each Dose to Equal  
 Morning Noon Evening Bedtime  
 amLODIPine 10 mg tablet Commonly known as:  David Reyez Your last dose was:  Monday 11/27 at 8:00 AM  
Your next dose is:  Tuesday 11/28 at 8:00 AM  
   
 Take 10 mg by mouth daily. 10 mg  
    
  
   
   
   
  
 aspirin delayed-release 81 mg tablet Your last dose was:  Monday 11/27 at 8:00 AM  
Your next dose is:  Tuesday 11/28 at 8:00 AM  
   
 Take 81 mg by mouth daily. 81 mg  
    
  
   
   
   
  
 dilTIAZem  mg ER capsule Commonly known as:  CARDIZEM CD Your last dose was:  Monday 11/27 at 8:00 AM  
Your next dose is:  Tuesday 11/28 at 8:00 AM  
   
 Take 240 mg by mouth daily. 240 mg  
    
  
   
   
   
  
 furosemide 40 mg tablet Commonly known as:  LASIX Your last dose was:  Monday 11/27 at 8:00 AM  
Your next dose is:  Tuesday 11/28 at 8:00 AM  
   
 Take 40 mg by mouth two (2) times a day. 40 mg  
    
  
   
   
  
   
  
 gemfibrozil 600 mg tablet Commonly known as:  LOPID Your last dose was:  Monday 11/27 at 8:00 AM  
Your next dose is:  Monday 11/27 at 6:00 PM  
   
 Take 600 mg by mouth two (2) times a day. 600 mg  
    
  
   
   
  
   
  
 insulin glargine 100 unit/mL injection Commonly known as:  LANTUS Your last dose was:  Monday 11/27 at 8:00 AM  
   
 15 Units by SubCUTAneous route daily. 15 Units  
    
  
   
   
   
  
 metFORMIN 1,000 mg tablet Commonly known as:  GLUCOPHAGE Take 1,000 mg by mouth two (2) times daily (with meals). 1000 mg  
    
  
   
   
  
   
  
 metoprolol succinate 50 mg XL tablet Commonly known as:  TOPROL-XL Your last dose was:  Monday 11/27 at 8:00 AM  
   
 Take 50 mg by mouth daily. 50 mg Where to Get Your Medications Information on where to get these meds will be given to you by the nurse or doctor. ! Ask your nurse or doctor about these medications  
  insulin glargine 100 unit/mL injection Discharge Instructions Avoiding Triggers With Heart Failure: Care Instructions Your Care Instructions Triggers are anything that make your heart failure flare up. A flare-up is also called \"sudden heart failure\" or \"acute heart failure. \" When you have a flare-up, fluid builds up in your lungs, and you have problems breathing. You might need to go to the hospital. By watching for changes in your condition and avoiding triggers, you can prevent heart failure flare-ups. Follow-up care is a key part of your treatment and safety. Be sure to make and go to all appointments, and call your doctor if you are having problems. It's also a good idea to know your test results and keep a list of the medicines you take. How can you care for yourself at home? Watch for changes in your weight and condition · Weigh yourself without clothing at the same time each day. Record your weight. Call your doctor if you have sudden weight gain, such as more than 2 to 3 pounds in a day or 5 pounds in a week. (Your doctor may suggest a different range of weight gain.) A sudden weight gain may mean that your heart failure is getting worse. · Keep a daily record of your symptoms. Write down any changes in how you feel, such as new shortness of breath, cough, or problems eating. Also record if your ankles are more swollen than usual and if you feel more tired than usual. Note anything that you ate or did that could have triggered these changes. Limit sodium Sodium causes your body to hold on to extra water. This may cause your heart failure symptoms to get worse. People get most of their sodium from processed foods. Fast food and restaurant meals also tend to be very high in sodium. · Your doctor may suggest that you limit sodium to 2,000 milligrams (mg) a day or less. That is less than 1 teaspoon of salt a day, including all the salt you eat in cooking or in packaged foods. · Read food labels on cans and food packages. They tell you how much sodium you get in one serving. Check the serving size. If you eat more than one serving, you are getting more sodium. · Be aware that sodium can come in forms other than salt, including monosodium glutamate (MSG), sodium citrate, and sodium bicarbonate (baking soda). MSG is often added to Asian food. You can sometimes ask for food without MSG or salt. · Slowly reducing salt will help you adjust to the taste. Take the salt shaker off the table. · Flavor your food with garlic, lemon juice, onion, vinegar, herbs, and spices instead of salt. Do not use soy sauce, steak sauce, onion salt, garlic salt, mustard, or ketchup on your food, unless it is labeled \"low-sodium\" or \"low-salt. \" 
· Make your own salad dressings, sauces, and ketchup without adding salt. · Use fresh or frozen ingredients, instead of canned ones, whenever you can. Choose low-sodium canned goods. · Eat less processed food and food from restaurants, including fast food. Exercise as directed Moderate, regular exercise is very good for your heart. It improves your blood flow and helps control your weight. But too much exercise can stress your heart and cause a heart failure flare-up. · Check with your doctor before you start an exercise program. 
· Walking is an easy way to get exercise. Start out slowly. Gradually increase the length and pace of your walk. Swimming, riding a bike, and using a treadmill are also good forms of exercise. · When you exercise, watch for signs that your heart is working too hard. You are pushing yourself too hard if you cannot talk while you are exercising. If you become short of breath or dizzy or have chest pain, stop, sit down, and rest. 
· Do not exercise when you do not feel well. Take medicines correctly · Take your medicines exactly as prescribed. Call your doctor if you think you are having a problem with your medicine. · Make a list of all the medicines you take. Include those prescribed to you by other doctors and any over-the-counter medicines, vitamins, or supplements you take. Take this list with you when you go to any doctor. · Take your medicines at the same time every day. It may help you to post a list of all the medicines you take every day and what time of day you take them. · Make taking your medicine as simple as you can. Plan times to take your medicines when you are doing other things, such as eating a meal or getting ready for bed. This will make it easier to remember to take your medicines. · Get organized. Use helpful tools, such as daily or weekly pill containers. When should you call for help? Call 911 if you have symptoms of sudden heart failure such as: 
? · You have severe trouble breathing. ? · You cough up pink, foamy mucus. ? · You have a new irregular or rapid heartbeat. ?Call your doctor now or seek immediate medical care if: 
? · You have new or increased shortness of breath. ? · You are dizzy or lightheaded, or you feel like you may faint. ? · You have sudden weight gain, such as more than 2 to 3 pounds in a day or 5 pounds in a week. (Your doctor may suggest a different range of weight gain.) ? · You have increased swelling in your legs, ankles, or feet. ? · You are suddenly so tired or weak that you cannot do your usual activities. ? Watch closely for changes in your health, and be sure to contact your doctor if you develop new symptoms. Where can you learn more? Go to http://cristobal-jeanette.info/. Enter L473 in the search box to learn more about \"Avoiding Triggers With Heart Failure: Care Instructions. \" Current as of: September 21, 2016 Content Version: 11.4 © 1452-8484 Ogone. Care instructions adapted under license by adQ (which disclaims liability or warranty for this information). If you have questions about a medical condition or this instruction, always ask your healthcare professional. [place] [address] any warranty or liability for your use of this information. Chronic Pulmonary Heart Disease: Care Instructions Your Care Instructions Your heart has four chambers. The lower right chamber, called the right ventricle, pumps blood to the lungs. Chronic pulmonary heart disease happens when the right ventricle has to work too hard to pump blood to lungs that have been damaged. The lungs may have been damaged by a condition like COPD (chronic obstructive pulmonary disease), blood clots in the lung, or sleep apnea. As time goes by, the right ventricle will get weaker and begin to fail. Over time, chronic pulmonary heart disease can cause fluid to build up in your body.  This buildup can cause fatigue, swelling in the legs and body, and other problems. The damage to your lungs may cause shortness of breath. You can take steps to feel better and live longer with this disease. These steps include taking medicines regularly, getting oxygen therapy if your doctor recommends it, and making lifestyle changes. Follow-up care is a key part of your treatment and safety. Be sure to make and go to all appointments, and call your doctor if you are having problems. It's also a good idea to know your test results and keep a list of the medicines you take. How can you care for yourself at home? Medicine · Your doctor may prescribe oxygen therapy to reduce your heart's workload and help you breathe easier. · Be safe with medicines. Take your medicines exactly as prescribed. Call your doctor if you think you are having a problem with your medicine. · Talk to your doctor before you take any vitamins, over-the-counter medicine, or herbal products. Don't take ibuprofen (Advil or Motrin) and naproxen (Aleve) without talking to your doctor first. They could make your heart problem worse. Weight · Weigh yourself without clothing at the same time each day. A sudden weight gain may mean that your heart problem is getting worse. Activity level · If your doctor recommends it, get more exercise. Walking is a good choice. Bit by bit, increase the amount you walk every day. Try for at least 30 minutes on most days of the week. · Watch for signs that your heart is working too hard when you exercise. You are pushing yourself too hard if you cannot talk while you are exercising. If you become short of breath or dizzy or have chest pain, stop, sit down, and rest. 
Lifestyle changes · Do not smoke. Smoking can make heart disease worse. If you need help quitting, talk to your doctor about stop-smoking programs and medicines. These can increase your chances of quitting for good. Quitting smoking may be the most important step you can take to protect your heart. · Limit alcohol to 2 drinks a day for men and 1 drink a day for women. Too much alcohol can cause health problems. · Avoid colds and flu. Get a pneumococcal vaccine shot. If you have had one before, ask your doctor if you need a second dose. Get a flu vaccine every fall. If you must be around people with colds or flu, wash your hands often. When should you call for help? Call 911 anytime you think you may need emergency care. For example, call if: 
? · You have symptoms of sudden heart failure. These may include: ¨ Severe trouble breathing. ¨ A fast or irregular heartbeat. ¨ Coughing up pink, foamy mucus. ? · You passed out (lost consciousness). ?Call your doctor now or seek immediate medical care if: 
? · You have new or worse trouble breathing. ? · You are dizzy or lightheaded, or you feel like you may faint. ? · You have sudden weight gain, such as more than 2 to 3 pounds in a day or 5 pounds in a week. (Your doctor may suggest a different range of weight gain.) ? Watch closely for changes in your health, and be sure to contact your doctor if: 
? · You have new or worse swelling in your belly, legs, ankles, or feet. ? · You do not get better as expected. Where can you learn more? Go to http://cristobal-jeanette.info/. Enter W954 in the search box to learn more about \"Chronic Pulmonary Heart Disease: Care Instructions. \" Current as of: September 21, 2016 Content Version: 11.4 © 1432-8475 Accupal. Care instructions adapted under license by Knozen (which disclaims liability or warranty for this information). If you have questions about a medical condition or this instruction, always ask your healthcare professional. [place] [address] any warranty or liability for your use of this information. [employer] Announcement  We are excited to announce that we are making your provider's discharge notes available to you in [employer]. You will see these notes when they are completed and signed by the physician that discharged you from your recent hospital stay. If you have any questions or concerns about any information you see in [employer], please call the Health Information Department where you were seen or reach out to your Primary Care Provider for more information about your plan of care. Introducing [facility]! Maggi Maldonado introduces [employer] patient portal. Now you can access parts of your medical record, email your doctor's office, and request medication refills online. 1. In your internet browser, go to [email or web address] 2. Click on the First Time User? Click Here link in the Sign In box. You will see the New Member Sign Up page. 3. Enter your [employer] Access Code exactly as it appears below. You will not need to use this code after youve completed the sign-up process. If you do not sign up before the expiration date, you must request a new code. · [employer] Access Code: [email or web address] Expires: 2/18/2018  3:37 PM 
 
4. Enter the last four digits of your Social Security Number (xxxx) and Date of Birth (mm/dd/yyyy) as indicated and click Submit. You will be taken to the next sign-up page. 5. Create a [employer] ID. This will be your [employer] login ID and cannot be changed, so think of one that is secure and easy to remember. 6. Create a [employer] password. You can change your password at any time. 7. Enter your Password Reset Question and Answer. This can be used at a later time if you forget your password. 8. Enter your e-mail address. You will receive e-mail notification when new information is available in [address] 9. Click Sign Up. You can now view and download portions of your medical record. 10. Click the Download Summary menu link to download a portable copy of your medical information. If you have questions, please visit the Frequently Asked Questions section of the MyChart website. Remember, [employer] Play[employer] is NOT to be used for urgent needs. For medical emergencies, dial 911. Now available from your iPhone and Android! Providers Seen During Your Hospitalization Provider Specialty Primary office phone Melinda Stovall MD Emergency Medicine [phone] Lina Early MD Internal Medicine [phone] Neeta Shukla MD Internal Medicine [phone] Levi Krishnan MD Internal Medicine [phone] Romeo Charles MD Internal Medicine [phone] Your Primary Care Physician (PCP) Primary Care Physician Office Phone Office Fax OTHER, PHYS ** None ** ** None ** You are allergic to the following No active allergies Recent Documentation Height Weight BMI  
  
  
 1.778 m 99 kg 31.32 kg/m2 Patient Belongings The following personal items are in your possession at time of discharge: 
  Dental Appliances: None  Visual Aid: None      Home Medications: Sent to pharmacy   Jewelry: None  Clothing: At bedside, Footwear, Pants, Shirt, Socks, Undergarments    Other Valuables: Cell Phone, [employer] Discharge Instructions Attachments/References SLEEP APNEA (ENGLISH) Patient Handouts Sleep Apnea: Care Instructions Your Care Instructions Sleep apnea means that you frequently stop breathing for 10 seconds or longer during sleep. It can be mild to severe, based on the number of times an hour that you stop breathing or have slowed breathing. Blocked or narrowed airways in your nose, mouth, or throat can cause sleep apnea. Your airway can become blocked when your throat muscles and tongue relax during sleep. You can treat sleep apnea at home by making lifestyle changes.  You also can use a CPAP breathing machine that keeps tissues in the throat from blocking your airway. Or your doctor may suggest that you use a breathing device while you sleep. It helps keep your airway open. This could be a device that you put in your mouth. Other examples include strips or disks that you use on your nose. In some cases, surgery may be needed to remove enlarged tissues in the throat. Follow-up care is a key part of your treatment and safety. Be sure to make and go to all appointments, and call your doctor if you are having problems. It's also a good idea to know your test results and keep a list of the medicines you take. How can you care for yourself at home? · Lose weight, if needed. It may reduce the number of times you stop breathing or have slowed breathing. · Sleep on your side. It may stop mild apnea. If you tend to roll onto your back, sew a pocket in the back of your pajama top. Put a tennis ball into the pocket, and stitch the pocket shut. This will help keep you from sleeping on your back. · Avoid alcohol and medicines such as sleeping pills and sedatives before bed. · Do not smoke. Smoking can make sleep apnea worse. If you need help quitting, talk to your doctor about stop-smoking programs and medicines. These can increase your chances of quitting for good. · Prop up the head of your bed 4 to 6 inches by putting bricks under the legs of the bed. · Treat breathing problems, such as a stuffy nose, caused by a cold or allergies. · Try a continuous positive airway pressure (CPAP) breathing machine if your doctor recommends it. The machine keeps your airway open when you sleep. · If CPAP does not work for you, ask your doctor if you can try other breathing machines. A bilevel positive airway pressure machine uses one type of air pressure for breathing in and another type for breathing out. Another device raises or lowers air pressure as needed while you breathe. · Talk to your doctor if: ¨ Your nose feels dry or bleeds when you use one of these machines. You may need to increase moisture in the air. A humidifier may help. ¨ Your nose is runny or stuffy from using a breathing machine. Decongestants or a corticosteroid nasal spray may help. ¨ You are sleepy during the day and it gets in the way of the normal things you do. Do not drive when you are drowsy. When should you call for help? Watch closely for changes in your health, and be sure to contact your doctor if: 
? · You still have sleep apnea even though you have made lifestyle changes. ? · You are thinking of trying a device such as CPAP. ? · You are having problems using a CPAP or similar machine. Where can you learn more? Go to http://cristobal-jeanette.info/. Enter O891 in the search box to learn more about \"Sleep Apnea: Care Instructions. \" Current as of: May 12, 2017 Content Version: 11.4 © 3480-2608 Eventcheq. Care instructions adapted under license by Push IO (which disclaims liability or warranty for this information). If you have questions about a medical condition or this instruction, always ask your healthcare professional. [address] any warranty or liability for your use of this information. Please provide this summary of care documentation to your next provider. Signatures-by signing, you are acknowledging that this After Visit Summary has been reviewed with you and you have received a copy. Patient Signature:  ____________________________________________________________ Date:  ____________________________________________________________  
  
Janyth Mins Provider Signature:  ____________________________________________________________ Date:  ____________________________________________________________

## 2017-11-19 ENCOUNTER — APPOINTMENT (OUTPATIENT)
Dept: GENERAL RADIOLOGY | Age: 37
DRG: 286 | End: 2017-11-19
Attending: INTERNAL MEDICINE
Payer: COMMERCIAL

## 2017-11-19 LAB
ALBUMIN SERPL-MCNC: 2.4 G/DL (ref 3.5–5)
ALBUMIN/GLOB SERPL: 0.5 {RATIO} (ref 1.1–2.2)
ALP SERPL-CCNC: 73 U/L (ref 45–117)
ALT SERPL-CCNC: 18 U/L (ref 12–78)
ANION GAP SERPL CALC-SCNC: 3 MMOL/L (ref 5–15)
ARTERIAL PATENCY WRIST A: YES
AST SERPL-CCNC: 10 U/L (ref 15–37)
BASE EXCESS BLDA CALC-SCNC: 4.7 MMOL/L
BASOPHILS # BLD: 0 K/UL (ref 0–0.1)
BASOPHILS NFR BLD: 0 % (ref 0–1)
BDY SITE: ABNORMAL
BILIRUB SERPL-MCNC: 0.3 MG/DL (ref 0.2–1)
BNP SERPL-MCNC: 1714 PG/ML (ref 0–125)
BUN SERPL-MCNC: 73 MG/DL (ref 6–20)
BUN/CREAT SERPL: 46 (ref 12–20)
CALCIUM SERPL-MCNC: 8.7 MG/DL (ref 8.5–10.1)
CHLORIDE SERPL-SCNC: 101 MMOL/L (ref 97–108)
CO2 SERPL-SCNC: 36 MMOL/L (ref 21–32)
CREAT SERPL-MCNC: 1.59 MG/DL (ref 0.7–1.3)
DIFFERENTIAL METHOD BLD: NORMAL
EOSINOPHIL # BLD: 0.3 K/UL (ref 0–0.4)
EOSINOPHIL NFR BLD: 3 % (ref 0–7)
EPAP/CPAP/PEEP, PAPEEP: 6
ERYTHROCYTE [DISTWIDTH] IN BLOOD BY AUTOMATED COUNT: 13.6 % (ref 11.5–14.5)
FIO2 ON VENT: 80 %
GAS FLOW.O2 SETTING OXYMISER: 8 L/MIN
GLOBULIN SER CALC-MCNC: 5.2 G/DL (ref 2–4)
GLUCOSE BLD STRIP.AUTO-MCNC: 107 MG/DL (ref 65–100)
GLUCOSE BLD STRIP.AUTO-MCNC: 115 MG/DL (ref 65–100)
GLUCOSE BLD STRIP.AUTO-MCNC: 149 MG/DL (ref 65–100)
GLUCOSE BLD STRIP.AUTO-MCNC: 96 MG/DL (ref 65–100)
GLUCOSE SERPL-MCNC: 81 MG/DL (ref 65–100)
HCO3 BLDA-SCNC: 35 MMOL/L (ref 22–26)
HCT VFR BLD AUTO: 37.8 % (ref 36.6–50.3)
HGB BLD-MCNC: 12.1 G/DL (ref 12.1–17)
IPAP/PIP, IPAPIP: 12
LYMPHOCYTES # BLD: 1.8 K/UL (ref 0.8–3.5)
LYMPHOCYTES NFR BLD: 21 % (ref 12–49)
MCH RBC QN AUTO: 27.2 PG (ref 26–34)
MCHC RBC AUTO-ENTMCNC: 32 G/DL (ref 30–36.5)
MCV RBC AUTO: 84.9 FL (ref 80–99)
MONOCYTES # BLD: 0.8 K/UL (ref 0–1)
MONOCYTES NFR BLD: 9 % (ref 5–13)
NEUTS SEG # BLD: 5.9 K/UL (ref 1.8–8)
NEUTS SEG NFR BLD: 67 % (ref 32–75)
NRBC # BLD: 0.13 K/UL (ref 0–0.01)
NRBC BLD-RTO: 1.5 PER 100 WBC
PCO2 BLDA: 79 MMHG (ref 35–45)
PH BLDA: 7.26 [PH] (ref 7.35–7.45)
PLATELET # BLD AUTO: 301 K/UL (ref 150–400)
PLATELET COMMENTS,PCOM: NORMAL
PO2 BLDA: 83 MMHG (ref 80–100)
POTASSIUM SERPL-SCNC: 4.5 MMOL/L (ref 3.5–5.1)
PROT SERPL-MCNC: 7.6 G/DL (ref 6.4–8.2)
RBC # BLD AUTO: 4.45 M/UL (ref 4.1–5.7)
RBC MORPH BLD: NORMAL
SAO2 % BLD: 94 % (ref 92–97)
SAO2% DEVICE SAO2% SENSOR NAME: ABNORMAL
SERVICE CMNT-IMP: ABNORMAL
SERVICE CMNT-IMP: NORMAL
SODIUM SERPL-SCNC: 140 MMOL/L (ref 136–145)
SPECIMEN SITE: ABNORMAL
TROPONIN I SERPL-MCNC: <0.04 NG/ML
VENTILATION MODE VENT: ABNORMAL
WBC # BLD AUTO: 8.8 K/UL (ref 4.1–11.1)
WBC NRBC COR # BLD: ABNORMAL 10*3/UL

## 2017-11-19 PROCEDURE — 83880 ASSAY OF NATRIURETIC PEPTIDE: CPT | Performed by: INTERNAL MEDICINE

## 2017-11-19 PROCEDURE — 93306 TTE W/DOPPLER COMPLETE: CPT

## 2017-11-19 PROCEDURE — 77010033678 HC OXYGEN DAILY

## 2017-11-19 PROCEDURE — 93307 TTE W/O DOPPLER COMPLETE: CPT

## 2017-11-19 PROCEDURE — 85025 COMPLETE CBC W/AUTO DIFF WBC: CPT | Performed by: INTERNAL MEDICINE

## 2017-11-19 PROCEDURE — 94660 CPAP INITIATION&MGMT: CPT

## 2017-11-19 PROCEDURE — 80053 COMPREHEN METABOLIC PANEL: CPT | Performed by: INTERNAL MEDICINE

## 2017-11-19 PROCEDURE — 84484 ASSAY OF TROPONIN QUANT: CPT | Performed by: INTERNAL MEDICINE

## 2017-11-19 PROCEDURE — 36415 COLL VENOUS BLD VENIPUNCTURE: CPT | Performed by: INTERNAL MEDICINE

## 2017-11-19 PROCEDURE — 74011250636 HC RX REV CODE- 250/636: Performed by: INTERNAL MEDICINE

## 2017-11-19 PROCEDURE — 82962 GLUCOSE BLOOD TEST: CPT

## 2017-11-19 PROCEDURE — 82803 BLOOD GASES ANY COMBINATION: CPT | Performed by: INTERNAL MEDICINE

## 2017-11-19 PROCEDURE — 74011250637 HC RX REV CODE- 250/637: Performed by: INTERNAL MEDICINE

## 2017-11-19 PROCEDURE — 74011000250 HC RX REV CODE- 250: Performed by: INTERNAL MEDICINE

## 2017-11-19 PROCEDURE — 74011636637 HC RX REV CODE- 636/637: Performed by: INTERNAL MEDICINE

## 2017-11-19 PROCEDURE — 36600 WITHDRAWAL OF ARTERIAL BLOOD: CPT | Performed by: INTERNAL MEDICINE

## 2017-11-19 PROCEDURE — 65660000000 HC RM CCU STEPDOWN

## 2017-11-19 PROCEDURE — 71010 XR CHEST PORT: CPT

## 2017-11-19 RX ORDER — INSULIN GLARGINE 100 [IU]/ML
10 INJECTION, SOLUTION SUBCUTANEOUS
Status: DISCONTINUED | OUTPATIENT
Start: 2017-11-19 | End: 2017-11-22

## 2017-11-19 RX ADMIN — METOPROLOL SUCCINATE 50 MG: 50 TABLET, FILM COATED, EXTENDED RELEASE ORAL at 09:02

## 2017-11-19 RX ADMIN — HEPARIN SODIUM 5000 UNITS: 5000 INJECTION, SOLUTION INTRAVENOUS; SUBCUTANEOUS at 21:39

## 2017-11-19 RX ADMIN — GEMFIBROZIL 600 MG: 600 TABLET ORAL at 09:09

## 2017-11-19 RX ADMIN — DILTIAZEM HYDROCHLORIDE 240 MG: 240 CAPSULE, COATED, EXTENDED RELEASE ORAL at 08:00

## 2017-11-19 RX ADMIN — BUMETANIDE 1 MG: 0.25 INJECTION INTRAMUSCULAR; INTRAVENOUS at 17:15

## 2017-11-19 RX ADMIN — HEPARIN SODIUM 5000 UNITS: 5000 INJECTION, SOLUTION INTRAVENOUS; SUBCUTANEOUS at 06:16

## 2017-11-19 RX ADMIN — HEPARIN SODIUM 5000 UNITS: 5000 INJECTION, SOLUTION INTRAVENOUS; SUBCUTANEOUS at 14:04

## 2017-11-19 RX ADMIN — INSULIN LISPRO 2 UNITS: 100 INJECTION, SOLUTION INTRAVENOUS; SUBCUTANEOUS at 11:43

## 2017-11-19 RX ADMIN — ASPIRIN 81 MG: 81 TABLET, COATED ORAL at 09:01

## 2017-11-19 RX ADMIN — BUMETANIDE 1 MG: 0.25 INJECTION INTRAMUSCULAR; INTRAVENOUS at 09:00

## 2017-11-19 RX ADMIN — GEMFIBROZIL 600 MG: 600 TABLET ORAL at 17:15

## 2017-11-19 RX ADMIN — INSULIN GLARGINE 10 UNITS: 100 INJECTION, SOLUTION SUBCUTANEOUS at 21:39

## 2017-11-19 RX ADMIN — Medication 10 ML: at 21:39

## 2017-11-19 RX ADMIN — Medication 10 ML: at 09:01

## 2017-11-19 NOTE — PROGRESS NOTES
SHIFT REPORT:  1900-Bedside shift change report given to Caroline Luther RN (oncoming nurse) by Mickey Akbar RN (offgoing nurse). Report included the following information SBAR, Kardex, Procedure Summary, Intake/Output, Recent Results and Cardiac Rhythm. SHIFT SUMMARY:  2130-sitting on side of bed with BIPAP on, ej well. 2345-off BIPAP for 10\", pox down to 87% with n/c, talking on telephone for short time until RN suggested he go back to BIPAP; Pox back up to low 90's  0345-stood at bedside for position change, gown change; sips H2O; remains on Bipap 80%  END OF SHIFT REPORT:  0700-Bedside shift change report given to Ann Varela RN (oncoming nurse) by Caroline Luther RN (offgoing nurse). Report included the following information SBAR, Kardex, Procedure Summary, Intake/Output, Recent Results and Cardiac Rhythm .

## 2017-11-19 NOTE — CONSULTS
Cardiology Consultation Note                                 Susan Buenrostro MD, [address], [place], [address] [facility]                         Phone [phone]; Fax [phone]            2017  1:53 PM  Meghna Do MD  :  1980   MRN:  [id number]     CC: SOB  Reason for consult: SOB  Admission Diagnosis: SOB (shortness of breath)    ASSESSMENT/RECOMMENDATIONS:   1)SOB  -probably related to sodium intake  -check echo and diurese  -could be anginal equivalent but neg stress test a year ago and no elevation in troponin therefore no stress test needed    2) DM  -HgbA1c 8 and needs better control  -stop diet soda    3) HTN  -under control currently    4) CAROLIN  -wears CPAP and has here with him    5) dyslipidemia  -followed in [facility]          WBC 8.8, H/H 12/38, K 4.5, BUN/Cretinine 1.59, pBNP 1714 and albumin 2.4, tropnin <0.04  CXR :IMPRESSION: No significant change. Bibasilar opacities. V/Q scan: impression: Low probability for pulmonary emboli. Sabrina Alexander is a 44 y.o. male I am seeing for dyspnea and hypoxia/ he has noted PAKO. He is visiting from [address] he has stated he has received treatment for CHF. CXR no CHF and BNP minimally elevated. Symptoms include: dyspnea  Cardiac risk factors: dyslipidemia, diabetes mellitus, male gender, hypertension. Oxygen sat was 87% on arrival.  He has been having a cough and rhinorrhe for 4 dasy with fever as well and told the ER this but states now this is not the case. Pt states he takes Lasix BID and is compliant with his medication. He has a cardioloigst in [facility]. Last admssion to hospital for similar sxs was in September. He has been here for two weeks and aunt making foods high in sodium. He ihad a stress test one year ago but unsure what his EF is . His Hgb A1c is 8.         No Known Allergies      Past Medical History:   Diagnosis Date    CHF (congestive heart failure) ([facility]     DM type 2 (diabetes mellitus, type 2) ([facility]     HTN (hypertension), benign     Hyperlipidemia     Sleep apnea         No past surgical history on file. .Home Medications:  Prior to Admission Medications   Prescriptions Last Dose Informant Patient Reported? Taking? amLODIPine (NORVASC) 10 mg tablet 11/18/2017 at Unknown time Self Yes Yes   Sig: Take 10 mg by mouth daily. aspirin delayed-release 81 mg tablet 11/18/2017 at Unknown time Self Yes Yes   Sig: Take 81 mg by mouth daily. dilTIAZem CD (CARDIZEM CD) 240 mg ER capsule 11/15/2017 at Unknown time Self Yes Yes   Sig: Take 240 mg by mouth daily. furosemide (LASIX) 40 mg tablet 11/18/2017 at am Self Yes Yes   Sig: Take 40 mg by mouth two (2) times a day. gemfibrozil (LOPID) 600 mg tablet 11/18/2017 at am Self Yes Yes   Sig: Take 600 mg by mouth two (2) times a day. metFORMIN (GLUCOPHAGE) 1,000 mg tablet 11/18/2017 at Unknown time Self Yes Yes   Sig: Take 1,000 mg by mouth two (2) times daily (with meals). metoprolol succinate (TOPROL-XL) 50 mg XL tablet 11/15/2017 Self Yes Yes   Sig: Take 50 mg by mouth daily.       Facility-Administered Medications: None       Hospital Medications:  Current Facility-Administered Medications   Medication Dose Route Frequency    sodium chloride (NS) flush 5-10 mL  5-10 mL IntraVENous Q8H    sodium chloride (NS) flush 5-10 mL  5-10 mL IntraVENous PRN    acetaminophen (TYLENOL) tablet 650 mg  650 mg Oral Q4H PRN    morphine injection 2 mg  2 mg IntraVENous Q4H PRN    naloxone (NARCAN) injection 0.4 mg  0.4 mg IntraVENous PRN    diphenhydrAMINE (BENADRYL) injection 12.5 mg  12.5 mg IntraVENous Q4H PRN    prochlorperazine (COMPAZINE) injection 5 mg  5 mg IntraVENous Q8H PRN    LORazepam (ATIVAN) tablet 1 mg  1 mg Oral BID PRN    bumetanide (BUMEX) injection 1 mg  1 mg IntraVENous BID    insulin lispro (HUMALOG) injection   SubCUTAneous AC&HS    glucose chewable tablet 16 g  4 Tab Oral PRN    dextrose (D50W) injection syrg 12.5-25 g  12.5-25 g IntraVENous PRN    glucagon (GLUCAGEN) injection 1 mg  1 mg IntraMUSCular PRN    insulin glargine (LANTUS) injection 15 Units  15 Units SubCUTAneous QHS    heparin (porcine) injection 5,000 Units  5,000 Units SubCUTAneous Q8H    aspirin delayed-release tablet 81 mg  81 mg Oral DAILY    dilTIAZem CD (CARDIZEM CD) capsule 240 mg  240 mg Oral DAILY    gemfibrozil (LOPID) tablet 600 mg  600 mg Oral BID    metoprolol succinate (TOPROL-XL) XL tablet 50 mg  50 mg Oral DAILY          OBJECTIVE       Laboratory and Imaging have been reviewed and are notable for      ECG:  Date:  sinus tachycardia      Diagnostic Tests:     Recent Labs      11/19/17   0250   TROIQ  <0.04     Recent Labs      11/19/17   0250  11/18/17   1411   NA  140  133*   K  4.5  5.0   CO2  36*  30   BUN  73*  76*   CREA  1.59*  2.08*   GLU  81  384*   WBC  8.8  10.6   HGB  12.1  13.2   HCT  37.8  40.1   PLT  301  320         Cardiac work up to date:                   Social History:  Social History   Substance Use Topics    Smoking status: Not on file    Smokeless tobacco: Not on file    Alcohol use Not on file       Family History:  Family History   Problem Relation Age of Onset    Heart Disease Neg Hx        Review of Symptoms:  A comprehensive review of systems was negative except for that written in the HPI. Physical Exam:      Visit Vitals    /79    Pulse (!) 106    Temp 97 °F (36.1 °C)    Resp 21    Ht 5' 10\" (1.778 m)    Wt 220 lb 7.4 oz (100 kg)    SpO2 95%    BMI 31.63 kg/m2     General Appearance:  Well developed, well nourished,alert and oriented x 3, and individual in no acute distress.    Ears/Nose/Mouth/Throat:   Hearing grossly normal.Normal oral mucosa,no scleral icterus     Neck: Supple no JVD or bruits,no cervical lymphadenopathy   Chest:   Lungs clear to auscultation bilaterally,no rales rhonchi or wheezing   Cardiovascular:  Fast rate   Abdomen: Soft, non-tender, bowel sounds are active. No abdominal bruits   Extremities: trace edema bilaterally. Pulses detected, no varicosities   Skin: Warm and dry. No bruising  Neuro  Moves all extermities and neurologically intact                                                       I have discussed the diagnosis with the patient and the intended plan as seen in the above orders. Questions were answered concerning future plans. I have discussed medication side effects and warnings with the patient as well. Beata Fox is in agreement to the plan listed above and wishes to proceed. he  was instructed not to smoke, eat heart healthy diet  and to exercise. Thank you for this consult.       Manuel Maldonado MD

## 2017-11-19 NOTE — PROGRESS NOTES
SHIFT REPORT:  1900- Bedside shift change report given to Mi Chan RN (oncoming nurse) by Barak Graves (offgoing nurse). Report included the following information SBAR, Kardex, Procedure Summary, Intake/Output, Recent Results and Cardiac Rhythm. SHIFT SUMMARY:  1920-arrived via stretcher from ER, walked to bed after being weighed. Adm vital done; wanting something to eat, Healthy Choice given  2130-Lantus Insulin given SQ as ordered; pt voided well using urinal; RT in for change in O2 mask plus 6l n/c  0005-Dr. Christopher Sanders called for failure to maintain POX above 85% on 6l and CPAP mask; will change to BIPAP  0015-RT placed on BIPAP 12/6 at 80%; pox up to 95%  0350-venous blood drawn for AM labs including Trop; pt resting well on BIPAP at 80%  0430-lab glucose back at 81; pt given juice to drink as he had only eaten 75% Healthy choice dinner before getting his ordered 15 units Lantus Insulin last evening. 0445-new orders noted for add on BNP, ABG and port xray; RT notified  0620-port chest X-ray done; pt remains on BIPAP, resting well, replied \"yes\" when asked if he felt better. END OF SHIFT REPORT:  0700-Bedside shift change report given to Kyle Walter RN (oncoming nurse) by Mi Chan RN (offgoing nurse). Report included the following information SBAR, Kardex, Procedure Summary, Intake/Output, Recent Results and Cardiac Rhythm .

## 2017-11-19 NOTE — PROGRESS NOTES
Bedside and Verbal shift change report given to Pru (oncoming nurse) by kesha (offgoing nurse). Report included the following information SBAR, Kardex, MAR, Accordion and Recent Results.

## 2017-11-19 NOTE — PROGRESS NOTES
Kyle Quick [facility]  [address] [facility], [place], [address] [place] [address]  [phone]      Medical Progress Note      NAME: Abhijit Pepper   :  1980  MRM:  [id number]    Date/Time: 2017  9:56 AM         Subjective:     Chief Complaint:  Sob. Overnight pt with increasing hypoxia requiring bipap. This morning he states his sob is stable. He denies chest pain or abdominal pain           Objective:       Vitals:          Last 24hrs VS reviewed since prior progress note.  Most recent are:    Visit Vitals    /79    Pulse (!) 106    Temp 97 °F (36.1 °C)    Resp 21    Ht 5' 10\" (1.778 m)    Wt 100 kg (220 lb 7.4 oz)    SpO2 95%    BMI 31.63 kg/m2     SpO2 Readings from Last 6 Encounters:   17 95%    O2 Flow Rate (L/min): 6 l/min     Intake/Output Summary (Last 24 hours) at 17 0956  Last data filed at 17 0430   Gross per 24 hour   Intake              590 ml   Output              900 ml   Net             -310 ml          Exam:     Physical Exam:    Gen:  obese, in no acute distress  HEENT:  Pink conjunctivae, PERRL, hearing intact to voice, moist mucous membranes  Neck:  Supple, without masses, thyroid non-tender  Resp:  No accessory muscle use, crackles at bases bilateral  Card:  No murmurs, normal S1, S2 without thrills, bruits or 1+ edema  Abd:  Soft, non-tender, non-distended, normoactive bowel sounds are present  Musc:  No cyanosis or clubbing  Skin:  No rashes or ulcers, skin turgor is good  Neuro:  No focal deficits follows commands appropriately  Psych:  Good insight, oriented to person, place and time, alert       Telemetry reviewed:  Sinus tachycardia    Medications Reviewed: (see below)    Lab Data Reviewed: (see below)    ______________________________________________________________________    Medications:     Current Facility-Administered Medications   Medication Dose Route Frequency    sodium chloride (NS) flush 5-10 mL  5-10 mL IntraVENous Q8H    sodium chloride (NS) flush 5-10 mL  5-10 mL IntraVENous PRN    acetaminophen (TYLENOL) tablet 650 mg  650 mg Oral Q4H PRN    morphine injection 2 mg  2 mg IntraVENous Q4H PRN    naloxone (NARCAN) injection 0.4 mg  0.4 mg IntraVENous PRN    diphenhydrAMINE (BENADRYL) injection 12.5 mg  12.5 mg IntraVENous Q4H PRN    prochlorperazine (COMPAZINE) injection 5 mg  5 mg IntraVENous Q8H PRN    LORazepam (ATIVAN) tablet 1 mg  1 mg Oral BID PRN    bumetanide (BUMEX) injection 1 mg  1 mg IntraVENous BID    insulin lispro (HUMALOG) injection   SubCUTAneous AC&HS    glucose chewable tablet 16 g  4 Tab Oral PRN    dextrose (D50W) injection syrg 12.5-25 g  12.5-25 g IntraVENous PRN    glucagon (GLUCAGEN) injection 1 mg  1 mg IntraMUSCular PRN    insulin glargine (LANTUS) injection 15 Units  15 Units SubCUTAneous QHS    heparin (porcine) injection 5,000 Units  5,000 Units SubCUTAneous Q8H    aspirin delayed-release tablet 81 mg  81 mg Oral DAILY    dilTIAZem CD (CARDIZEM CD) capsule 240 mg  240 mg Oral DAILY    gemfibrozil (LOPID) tablet 600 mg  600 mg Oral BID    metoprolol succinate (TOPROL-XL) XL tablet 50 mg  50 mg Oral DAILY            Lab Review:     Recent Labs      11/19/17   0250  11/18/17   1411   WBC  8.8  10.6   HGB  12.1  13.2   HCT  37.8  40.1   PLT  301  320     Recent Labs      11/19/17   0250  11/18/17   1411   NA  140  133*   K  4.5  5.0   CL  101  92*   CO2  36*  30   GLU  81  384*   BUN  73*  76*   CREA  1.59*  2.08*   CA  8.7  8.9   ALB  2.4*  2.8*   SGOT  10*  15   ALT  18  15     No components found for: GLPOC         Assessment / Plan:     Acute on chronic CHF with unknown LVEF: suspected. Has a hx of CHF, now elevated pro BNP and dyspneic. continue IV diuresis with bumex, daily weights and closely monitor fluid input, output balance. Echo pending. Cardiology consulted     Acute hypoxic respiratory failure: VQ scan low probability for PE. Likely 2/2 above; requiring bipap support overnight. Will continue as this will aid in diuresis. MANOHAR: unknown baseline Cr, improving with diuresis. Will continue     Type 2 diabetes mellitus with renal complication ([facility] (43/25/0690): check an A1c. Holding metformin due to renal function. Continue lantus but decrease dose.     HTN (hypertension), benign (11/18/2017): resume metoprolol and dilt     Hyperlipidemia (11/18/2017): resume lopid     Sleep apnea (11/18/2017): he says his machine is NOT working. Oxygen as needed.  Out patient with PCP         Total time spent with patient: 40 [address] 1951 discussed with: Patient    Discussed:  Care Plan    Prophylaxis:  Lovenox    Disposition:  Home w/Family           ___________________________________________________    Attending Physician: Yulisa White MD

## 2017-11-19 NOTE — ROUTINE PROCESS
Primary Nurse Miriam Urias RN and Karina Powell RN performed a dual skin assessment on this patient No impairment noted  Anjel score is 23

## 2017-11-19 NOTE — PROGRESS NOTES
CM met with patient to complete initial assessment. Patient lives in [place], he is in [place] for the weekend visiting his aunt Abdirashid Ornelas [phone]). Patient is employed full time and has [employer]. Prior to admission patient used a CPAP machine at home at night (it is at bedside). He is followed by a Cardilogist in [place], Dr. Devika Whitlock, and PCP, Dr. Lilian Wilson. Rx's typically filled at [facility] in  [place], [place] without difficulty. Patient reports he was planning to return home tomorrow via train and has a  ticket reserved, he will call [employer] to see about changing his ticket for a later date. Patient is currently on 6 ltrs of O2, however he reports to CM that he does not believe he will be discharged with O2. CM will follow for discharge planning needs. FLORIDALMA Powell  Care Management Interventions  PCP Verified by CM:  Yes  Mode of Transport at Discharge:  ([address])  Current Support Network: Own Home  Discharge Location  Discharge Placement: Home

## 2017-11-19 NOTE — PROGRESS NOTES
Problem: Falls - Risk of  Goal: *Absence of Falls  Document Felipe Fall Risk and appropriate interventions in the flowsheet.    Outcome: Progressing Towards Goal  Fall Risk Interventions:            Medication Interventions: Patient to call before getting OOB

## 2017-11-20 ENCOUNTER — APPOINTMENT (OUTPATIENT)
Dept: CT IMAGING | Age: 37
DRG: 286 | End: 2017-11-20
Payer: COMMERCIAL

## 2017-11-20 LAB
ANION GAP SERPL CALC-SCNC: 3 MMOL/L (ref 5–15)
ARTERIAL PATENCY WRIST A: YES
ATRIAL RATE: 125 BPM
BASE EXCESS BLDA CALC-SCNC: 1.3 MMOL/L
BDY SITE: ABNORMAL
BUN SERPL-MCNC: 67 MG/DL (ref 6–20)
BUN/CREAT SERPL: 51 (ref 12–20)
CALCIUM SERPL-MCNC: 9.2 MG/DL (ref 8.5–10.1)
CALCULATED P AXIS, ECG09: 51 DEGREES
CALCULATED R AXIS, ECG10: 69 DEGREES
CALCULATED T AXIS, ECG11: 27 DEGREES
CHLORIDE SERPL-SCNC: 101 MMOL/L (ref 97–108)
CO2 SERPL-SCNC: 37 MMOL/L (ref 21–32)
CREAT SERPL-MCNC: 1.31 MG/DL (ref 0.7–1.3)
DIAGNOSIS, 93000: NORMAL
ERYTHROCYTE [DISTWIDTH] IN BLOOD BY AUTOMATED COUNT: 13.7 % (ref 11.5–14.5)
FIO2 ON VENT: 100 %
GLUCOSE BLD STRIP.AUTO-MCNC: 100 MG/DL (ref 65–100)
GLUCOSE BLD STRIP.AUTO-MCNC: 146 MG/DL (ref 65–100)
GLUCOSE BLD STRIP.AUTO-MCNC: 169 MG/DL (ref 65–100)
GLUCOSE BLD STRIP.AUTO-MCNC: 240 MG/DL (ref 65–100)
GLUCOSE BLD STRIP.AUTO-MCNC: 73 MG/DL (ref 65–100)
GLUCOSE SERPL-MCNC: 107 MG/DL (ref 65–100)
HCO3 BLDA-SCNC: 31 MMOL/L (ref 22–26)
HCT VFR BLD AUTO: 43.5 % (ref 36.6–50.3)
HGB BLD-MCNC: 13.4 G/DL (ref 12.1–17)
MAGNESIUM SERPL-MCNC: 2.2 MG/DL (ref 1.6–2.4)
MCH RBC QN AUTO: 26.7 PG (ref 26–34)
MCHC RBC AUTO-ENTMCNC: 30.8 G/DL (ref 30–36.5)
MCV RBC AUTO: 86.7 FL (ref 80–99)
P-R INTERVAL, ECG05: 168 MS
PCO2 BLDA: 72 MMHG (ref 35–45)
PH BLDA: 7.25 [PH] (ref 7.35–7.45)
PHOSPHATE SERPL-MCNC: 4.5 MG/DL (ref 2.6–4.7)
PLATELET # BLD AUTO: 341 K/UL (ref 150–400)
PO2 BLDA: 120 MMHG (ref 80–100)
POTASSIUM SERPL-SCNC: 4.4 MMOL/L (ref 3.5–5.1)
Q-T INTERVAL, ECG07: 286 MS
QRS DURATION, ECG06: 74 MS
QTC CALCULATION (BEZET), ECG08: 412 MS
RBC # BLD AUTO: 5.02 M/UL (ref 4.1–5.7)
SAO2 % BLD: 98 % (ref 92–97)
SAO2% DEVICE SAO2% SENSOR NAME: ABNORMAL
SERVICE CMNT-IMP: ABNORMAL
SERVICE CMNT-IMP: NORMAL
SERVICE CMNT-IMP: NORMAL
SODIUM SERPL-SCNC: 141 MMOL/L (ref 136–145)
SPECIMEN SITE: ABNORMAL
VENTRICULAR RATE, ECG03: 125 BPM
WBC # BLD AUTO: 7.8 K/UL (ref 4.1–11.1)

## 2017-11-20 PROCEDURE — 74011000250 HC RX REV CODE- 250: Performed by: INTERNAL MEDICINE

## 2017-11-20 PROCEDURE — 82962 GLUCOSE BLOOD TEST: CPT

## 2017-11-20 PROCEDURE — 36415 COLL VENOUS BLD VENIPUNCTURE: CPT | Performed by: INTERNAL MEDICINE

## 2017-11-20 PROCEDURE — 74011636320 HC RX REV CODE- 636/320: Performed by: RADIOLOGY

## 2017-11-20 PROCEDURE — 74011636637 HC RX REV CODE- 636/637: Performed by: INTERNAL MEDICINE

## 2017-11-20 PROCEDURE — 77010033678 HC OXYGEN DAILY

## 2017-11-20 PROCEDURE — 65660000000 HC RM CCU STEPDOWN

## 2017-11-20 PROCEDURE — 80048 BASIC METABOLIC PNL TOTAL CA: CPT | Performed by: INTERNAL MEDICINE

## 2017-11-20 PROCEDURE — 84100 ASSAY OF PHOSPHORUS: CPT | Performed by: INTERNAL MEDICINE

## 2017-11-20 PROCEDURE — 83735 ASSAY OF MAGNESIUM: CPT | Performed by: INTERNAL MEDICINE

## 2017-11-20 PROCEDURE — 85027 COMPLETE CBC AUTOMATED: CPT | Performed by: INTERNAL MEDICINE

## 2017-11-20 PROCEDURE — 74011250636 HC RX REV CODE- 250/636: Performed by: INTERNAL MEDICINE

## 2017-11-20 PROCEDURE — 71275 CT ANGIOGRAPHY CHEST: CPT

## 2017-11-20 PROCEDURE — 94010 BREATHING CAPACITY TEST: CPT

## 2017-11-20 PROCEDURE — 93970 EXTREMITY STUDY: CPT

## 2017-11-20 PROCEDURE — 74011250637 HC RX REV CODE- 250/637: Performed by: INTERNAL MEDICINE

## 2017-11-20 RX ORDER — ENOXAPARIN SODIUM 100 MG/ML
40 INJECTION SUBCUTANEOUS EVERY 24 HOURS
Status: DISCONTINUED | OUTPATIENT
Start: 2017-11-20 | End: 2017-11-27 | Stop reason: HOSPADM

## 2017-11-20 RX ADMIN — BUMETANIDE 1 MG: 0.25 INJECTION INTRAMUSCULAR; INTRAVENOUS at 09:33

## 2017-11-20 RX ADMIN — ENOXAPARIN SODIUM 40 MG: 40 INJECTION SUBCUTANEOUS at 17:23

## 2017-11-20 RX ADMIN — Medication 10 ML: at 22:29

## 2017-11-20 RX ADMIN — HEPARIN SODIUM 5000 UNITS: 5000 INJECTION, SOLUTION INTRAVENOUS; SUBCUTANEOUS at 05:50

## 2017-11-20 RX ADMIN — METOPROLOL SUCCINATE 50 MG: 50 TABLET, FILM COATED, EXTENDED RELEASE ORAL at 09:33

## 2017-11-20 RX ADMIN — INSULIN LISPRO 2 UNITS: 100 INJECTION, SOLUTION INTRAVENOUS; SUBCUTANEOUS at 13:07

## 2017-11-20 RX ADMIN — ASPIRIN 81 MG: 81 TABLET, COATED ORAL at 09:33

## 2017-11-20 RX ADMIN — INSULIN GLARGINE 10 UNITS: 100 INJECTION, SOLUTION SUBCUTANEOUS at 22:29

## 2017-11-20 RX ADMIN — IOPAMIDOL 80 ML: 755 INJECTION, SOLUTION INTRAVENOUS at 11:18

## 2017-11-20 RX ADMIN — BUMETANIDE 1 MG: 0.25 INJECTION INTRAMUSCULAR; INTRAVENOUS at 17:23

## 2017-11-20 RX ADMIN — Medication 10 ML: at 14:00

## 2017-11-20 RX ADMIN — GEMFIBROZIL 600 MG: 600 TABLET ORAL at 09:33

## 2017-11-20 RX ADMIN — INSULIN LISPRO 2 UNITS: 100 INJECTION, SOLUTION INTRAVENOUS; SUBCUTANEOUS at 17:23

## 2017-11-20 RX ADMIN — GEMFIBROZIL 600 MG: 600 TABLET ORAL at 17:24

## 2017-11-20 RX ADMIN — INSULIN LISPRO 2 UNITS: 100 INJECTION, SOLUTION INTRAVENOUS; SUBCUTANEOUS at 22:29

## 2017-11-20 RX ADMIN — DILTIAZEM HYDROCHLORIDE 240 MG: 240 CAPSULE, COATED, EXTENDED RELEASE ORAL at 09:33

## 2017-11-20 NOTE — NURSE NAVIGATOR
Chart reviewed by Heart Failure Nurse Navigator. Heart Failure database completed. Current Echo shows EF 55-60% with LV hypertrophy. ACEi/ARB: not indicated. BB: metoprolol succinate 50 mg daily. CRT not indicated. NYHA Functional Class none assigned. Heart Failure Teach Back in Patient Education. Heart Failure Avoiding Triggers on Discharge Instructions. Patient is from [place]. PCP and all medical providers are in [place]. He was staying with his aunt who lives in the area and states he was eating foods higher in salt than he usually eats.

## 2017-11-20 NOTE — PROGRESS NOTES
Progress Note                                        Mustapha Mcarthur MD, [address] [facility] [address], [place], [address] [place] [address] [place]                                                 Phone [phone]; Fax [phone]        2017 9:52 PM     Admit Date:           2017  Admit Diagnosis:  SOB (shortness of breath)  :          1980   MRN:          [id number]   ASSESSMENT/RECOMMENDATION:   1)SOB:   - hypoxia on room air   -ECHO normal EF  -cont diuresis  -could be anginal equivalent but neg stress test a year ago and no elevation in troponin  therefore no stress test needed  - 6 MWT prior to d/c  - pulmonary to see     2) DM  -HgbA1c 8 and needs better control, DCT to see   -stop diet soda    3. Sinus tach:   - ECHO pending  - consider CTA chest     4) HTN  -cont dilt, toprol     5) CAROLIN  -cont CPAP     6) dyslipidemia  -follows in [place]               ATTENDING CARDIOLOGIST    PATIENT was  Personally examined and chart reviewed. All the elements of history and examination were personally performed and I agree with the plan as listed above. Treatment plan was addressed with the patient. Maria Esther Corea MD      pBNP 1714 and albumin 2.4, tropnin <0.04  CXR :IMPRESSION: No significant change. Bibasilar opacities. V/Q scan: impression: Low probability for pulmonary emboli. 1901 -  0700  In: 120 [P.O.:120]  Out: 4050 [Urine:1650]    Last 3 Recorded Weights in this Encounter    17 1351 17 1931 17 0621   Weight: 227 lb (103 kg) 221 lb 9 oz (100.5 kg) 220 lb 7.4 oz (100 kg)          0701 -  190  In: 7853 [P.O.:1790]  Out: 1539 [Urine:2875]    JALIL Nelson is a 44 y.o. male I am seeing for dyspnea and hypoxia/ he has noted PAKO. He is visiting from [place] he has stated he has received treatment for CHF.  CXR no CHF and BNP minimally elevated. Symptoms include: dyspnea  Cardiac risk factors: dyslipidemia, diabetes mellitus, male gender, hypertension. Oxygen sat was 87% on arrival.  He has been having a cough and rhinorrhe for 4 dasy with fever as well and told the ER this but states now this is not the case. Pt states he takes Lasix BID and is compliant with his medication. He has a cardioloigst in [place]. Last admssion to hospital for similar sxs was in September. He has been here for two weeks and aunt making foods high in sodium. He ihad a stress test one year ago but unsure what his EF is . His Hgb A1c is 8. Gabe Salmeron reports dyspnea on exertion.       Current Facility-Administered Medications   Medication Dose Route Frequency    insulin glargine (LANTUS) injection 10 Units  10 Units SubCUTAneous QHS    sodium chloride (NS) flush 5-10 mL  5-10 mL IntraVENous Q8H    sodium chloride (NS) flush 5-10 mL  5-10 mL IntraVENous PRN    acetaminophen (TYLENOL) tablet 650 mg  650 mg Oral Q4H PRN    morphine injection 2 mg  2 mg IntraVENous Q4H PRN    naloxone (NARCAN) injection 0.4 mg  0.4 mg IntraVENous PRN    diphenhydrAMINE (BENADRYL) injection 12.5 mg  12.5 mg IntraVENous Q4H PRN    prochlorperazine (COMPAZINE) injection 5 mg  5 mg IntraVENous Q8H PRN    LORazepam (ATIVAN) tablet 1 mg  1 mg Oral BID PRN    bumetanide (BUMEX) injection 1 mg  1 mg IntraVENous BID    insulin lispro (HUMALOG) injection   SubCUTAneous AC&HS    glucose chewable tablet 16 g  4 Tab Oral PRN    dextrose (D50W) injection syrg 12.5-25 g  12.5-25 g IntraVENous PRN    glucagon (GLUCAGEN) injection 1 mg  1 mg IntraMUSCular PRN    heparin (porcine) injection 5,000 Units  5,000 Units SubCUTAneous Q8H    aspirin delayed-release tablet 81 mg  81 mg Oral DAILY    dilTIAZem CD (CARDIZEM CD) capsule 240 mg  240 mg Oral DAILY    gemfibrozil (LOPID) tablet 600 mg  600 mg Oral BID    metoprolol succinate (TOPROL-XL) XL tablet 50 mg  50 mg Oral DAILY      OBJECTIVE               Intake/Output Summary (Last 24 hours) at 11/19/17 2152  Last data filed at 11/19/17 2143   Gross per 24 hour   Intake             1560 ml   Output             4075 ml   Net            -2515 ml       Review of Systems - History obtained from the patient AS PER  HPI        PHYSICAL EXAM        Visit Vitals    /85    Pulse (!) 104    Temp 98.5 °F (36.9 °C)    Resp 20    Ht 5' 10\" (1.778 m)    Wt 220 lb 7.4 oz (100 kg)    SpO2 97%    BMI 31.63 kg/m2       Gen: Well-developed, well-nourished, in no acute distress  alert and oriented x 3  HEENT:  Pink conjunctivae, Hearing grossly normal.No scleral icterus or conjunctival, moist mucous membranes  Neck: Supple,No JVD, No Carotid Bruit, Thyroid- non tender No cervical lymphadenopathy  Resp: No accessory muscle use, Clear breath sounds, No rales or rhonchi  Card: Regular Rate,Rythm,Normal S1, S2, No murmurs, rubs or gallop. No thrills.    MSK: No cyanosis or clubbing, good capillary refill  Skin: No rashes or ulcers, no bruising  Neuro:  Cranial nerves are grossly intact, moving all four extremities, no focal deficit, follows commands appropriately  Psych:  Good insight, oriented to person, place and time, alert, Nml Affect  LE: + 1 LE  edema       DATA REVIEW            Laboratory and Imaging have been reviewed by me and are notable for  Recent Labs      11/19/17   0250  11/18/17   2030  11/18/17   1411   TROIQ  <0.04  <0.04  <0.04     Recent Labs      11/19/17   0250  11/18/17   1411   NA  140  133*   K  4.5  5.0   CO2  36*  30   BUN  73*  76*   CREA  1.59*  2.08*   GLU  81  384*   WBC  8.8  10.6   HGB  12.1  13.2   HCT  37.8  40.1   PLT  301  320             Sage Clements MD

## 2017-11-20 NOTE — PROGRESS NOTES
Problem: Falls - Risk of  Goal: *Absence of Falls  Document Felipe Fall Risk and appropriate interventions in the flowsheet. Outcome: Progressing Towards Goal  Fall Risk Interventions:. ..    0700- Bedside and Verbal shift change report given to Jeff Amador RN (oncoming nurse) by PRU RN  (offgoing nurse). Report included the following information SBAR, Kardex and Recent Results. ... Back and forth pt is on BIPAP and O2 6l NC PRN. .Still SOB noted. . Lung - wheezs noted. . NEB PRN. ..    1900- Bedside and Verbal shift change report given to PRU RN  (oncoming nurse) by Jeff Amador RN (offgoing nurse). Report included the following information SBAR, Kardex and Recent Results. Ella Ziegler ...

## 2017-11-20 NOTE — PROGRESS NOTES
11- CASE MANAGEMENT NOTE:  I met with the pt to determine potential discharge needs. The pt lives with his Godmother in a one story house in [place], [place]. He is independent with his ADL's, works full-time and drives. He is in town visiting his aunt, Sandeep Walters ([phone]). His PCP is Dr. Jazmín Peace in [place], [place]. He has prescription drug coverage and gets his medications from [facility] in [place], [place]. He is not anticipating any discharge needs.  Babak Palmer, BSW, CM

## 2017-11-20 NOTE — PROCEDURES
[facility]  *** FINAL REPORT ***    Name: Darlene Mendez  MRN: [id number]    Inpatient  : 24 Sep 1980  HIS Order #: [id number]  [id number] [facility] Visit #: [id number]  Date: 2017    TYPE OF TEST: Peripheral Venous Testing    REASON FOR TEST  Shortness of breath    Right Leg:-  Deep venous thrombosis:           No  Superficial venous thrombosis:    No  Deep venous insufficiency:        No  Superficial venous insufficiency: No    Left Leg:-  Deep venous thrombosis:           No  Superficial venous thrombosis:    No  Deep venous insufficiency:        No  Superficial venous insufficiency: No      INTERPRETATION/FINDINGS  PROCEDURE:  BILATERAL LE VENOUS DUPLEX. Evaluation of lower extremity veins with ultrasound (B-mode imaging,  pulsed Doppler, color Doppler). Includes the common femoral, deep  femoral, femoral, popliteal, posterior tibial, peroneal, and great  saphenous veins. FINDINGS:  Anant Prose scale and color flow duplex images of the veins in  both lower extremities demonstrate normal compressibility, spontaneous   and augmented flow profiles, and absence of filling defects  throughout the deep and superficial veins in both lower extremities. CONCLUSION:  Bilateral lower extremity venous duplex negative for deep   venous thrombosis or thrombophlebitis. Multiple enlarged lymph nodes  noted in the groin bilaterally, the largest measuring 3.44 x 1.01 cm  on the right and 2.64 x 00.98 cm on the left. ADDITIONAL COMMENTS    I have personally reviewed the data relevant to the interpretation of  this  study. TECHNOLOGIST: Kaleb Sheets RDCS  Signed: 2017 12:33 PM    PHYSICIAN: Vashti Dawley. Buck Boast, MD  Signed: 2017 01:31 PM

## 2017-11-20 NOTE — PROCEDURES
[facility]   [address], [address]   2-D ECHOCARDIOGRAM       Name:  Perri Soulier   MR#:  [id number]   :  1980   Account #:  [de-identified]    Date of Procedure:  2017   Date of Adm:  2017       ECHOCARDIOGRAM REPORT   1. Left ventricular chamber length appears to be normal. There is   evidence of left ventricular hypertrophy with EF of 55% to 60%, with   septal balance to suggest bundle branch. 2. Left atrial size appears to be normal. NALDO no well visualized. 3. Aortic root dimensions appear to be normal.   4. Aortic valve, no stenosis or insufficiency, mild tricuspid. 5. Mitral valve, no mitral valve prolapse, with trace mitral regurgitation. 6. Tricuspid valve appears structurally normal, with mild tricuspid   regurgitation. 7. Pulmonic valve not well visualized. 8. RA normal.   9. Right Heart dimensions appear to be normal size and function. 10. No pericardial effusion. SUMMARY: Ejection fraction of LV is 60% with left ventricular   hypertrophy and mild tricuspid regurgitation.           Darvin Rain MD      [facility] / [facility]   D:  2017   09:06   T:  2017   11:40   Job #:  [id number]

## 2017-11-20 NOTE — CONSULTS
Name: [facility]: [address]   : 1980 Admit Date: 2017   Phone:   Room: 324/01   PCP: Tony Drake MD  MRN: [id number]   Date: 2017  Code: Full Code        HPI:    Chart and notes reviewed. Data reviewed. I review the patient's current medications in the medical record at each encounter. I have evaluated and examined the patient. 9:29 AM       History was obtained from patient and chart. I was asked by Lisa Hopkins MD to see Raul Engel in consultation for a chief complaint of hypoxia. Mr. Micaela Gabriel is a very pleasant 44year old male who presented to the [facility] ED yesterday with SOB and hypoxia. He has been visiting from [facility] and took the train down to [place] two weeks ago. He has history of CHF and reported LE swelling as well. On Lasix 40mg BID at baseline. States he initially felt feverish and achy, but that has resolved. Denies any URI symptoms, cough, congestion, or drainage. Denies CP. Denies history of lung disease and does not use inhalers, nebs, or home O2 at baseline. He does have history of CAROLIN and uses CPAP at baseline, but states he machine recently broke and he has an upcoming appointment with his sleep provider to see about getting a new unit. Currently, patient has no complaints. Denies SOB at rest, despite sats of 87-91% on 6L. Denies fever, chills, or cough this morning. Denies abd pain. Denies LE pain. CXR personally visualized. There is cardiomegaly and bibasilar opacities/atx. V/Q scan with low probability for PE. WBC 7.8  Hgb 13.4  Bicarb 37  Creat 1.31  proBNP 1714  Trop < 0.04  ABG 7.26/79/83/35  I/O: - 2755 ml      Past Medical History:   Diagnosis Date    [facility]     DM type 2 (diabetes mellitus, type 2) ([facility]     HTN (hypertension), benign     Hyperlipidemia     Sleep apnea        No past surgical history on file.     Family History   Problem Relation Age of Onset    Heart Disease Neg Hx        Social History   Substance Use Topics    Smoking status: Not on file    Smokeless tobacco: Not on file    Alcohol use Not on file       No Known Allergies    Current Facility-Administered Medications   Medication Dose Route Frequency    insulin glargine (LANTUS) injection 10 Units  10 Units SubCUTAneous QHS    sodium chloride (NS) flush 5-10 mL  5-10 mL IntraVENous Q8H    sodium chloride (NS) flush 5-10 mL  5-10 mL IntraVENous PRN    acetaminophen (TYLENOL) tablet 650 mg  650 mg Oral Q4H PRN    morphine injection 2 mg  2 mg IntraVENous Q4H PRN    naloxone (NARCAN) injection 0.4 mg  0.4 mg IntraVENous PRN    diphenhydrAMINE (BENADRYL) injection 12.5 mg  12.5 mg IntraVENous Q4H PRN    prochlorperazine (COMPAZINE) injection 5 mg  5 mg IntraVENous Q8H PRN    LORazepam (ATIVAN) tablet 1 mg  1 mg Oral BID PRN    bumetanide (BUMEX) injection 1 mg  1 mg IntraVENous BID    insulin lispro (HUMALOG) injection   SubCUTAneous AC&HS    glucose chewable tablet 16 g  4 Tab Oral PRN    dextrose (D50W) injection syrg 12.5-25 g  12.5-25 g IntraVENous PRN    glucagon (GLUCAGEN) injection 1 mg  1 mg IntraMUSCular PRN    heparin (porcine) injection 5,000 Units  5,000 Units SubCUTAneous Q8H    aspirin delayed-release tablet 81 mg  81 mg Oral DAILY    dilTIAZem CD (CARDIZEM CD) capsule 240 mg  240 mg Oral DAILY    gemfibrozil (LOPID) tablet 600 mg  600 mg Oral BID    metoprolol succinate (TOPROL-XL) XL tablet 50 mg  50 mg Oral DAILY         REVIEW OF SYSTEMS   12 point ROS negative except as stated in the HPI. Physical Exam:   Visit Vitals    /78    Pulse (!) 104    Temp 97.8 °F (36.6 °C)    Resp 21    Ht 5' 10\" (1.778 m)    Wt 100.9 kg (222 lb 7.1 oz)    SpO2 97%    BMI 31.92 kg/m2       General:  Alert, cooperative, no distress, appears stated age. Head:  Normocephalic, without obvious abnormality, atraumatic. Eyes:  Conjunctivae/corneas clear.     Nose: Nares normal. Septum midline. Mucosa normal.    Throat: Lips, mucosa, and tongue normal.    Neck: Supple, symmetrical, trachea midline, no adenopathy. Lungs:   Clear to auscultation bilaterally, mildly diminished in the bases. No wheeze or rales appreciated. Chest wall:  No tenderness or deformity. Heart:  Tachycardic with rate 100-110s, regular rhythm, no murmur, click, rub or gallop. Abdomen:   Soft, non-tender. Bowel sounds normal. No masses,  No organomegaly. Extremities: Extremities normal, atraumatic, no cyanosis, 1+ LE edema. Pulses: 2+ and symmetric all extremities. Skin: Skin color, texture, turgor normal. No rashes or lesions   Lymph nodes: Cervical, supraclavicular nodes normal.   Neurologic: Grossly nonfocal       Lab Results   Component Value Date/Time    Sodium 141 11/20/2017 01:41 AM    Potassium 4.4 11/20/2017 01:41 AM    Chloride 101 11/20/2017 01:41 AM    CO2 37 11/20/2017 01:41 AM    BUN 67 11/20/2017 01:41 AM    Creatinine 1.31 11/20/2017 01:41 AM    Glucose 107 11/20/2017 01:41 AM    Calcium 9.2 11/20/2017 01:41 AM    Magnesium 2.2 11/20/2017 01:41 AM    Phosphorus 4.5 11/20/2017 01:41 AM       Lab Results   Component Value Date/Time    WBC 7.8 11/20/2017 01:41 AM    HGB 13.4 11/20/2017 01:41 AM    PLATELET 044 99/20/9487 01:41 AM    MCV 86.7 11/20/2017 01:41 AM       Lab Results   Component Value Date/Time    AST (SGOT) 10 11/19/2017 02:50 AM    Alk.  phosphatase 73 11/19/2017 02:50 AM    Protein, total 7.6 11/19/2017 02:50 AM    Albumin 2.4 11/19/2017 02:50 AM    Globulin 5.2 11/19/2017 02:50 AM       No results found for: IRON, FE, TIBC, IBCT, PSAT, FERR    No results found for: SR, CRP, YASMIN, ANAIGG, RA, RPR, RPRT, VDRLT, VDRLS, TSH, TSHEXT     No results found for: PH, PHI, PCO2, PCO2I, PO2, PO2I, HCO3, HCO3I, FIO2, FIO2I    Lab Results   Component Value Date/Time    Troponin-I, Qt. <0.04 11/19/2017 02:50 AM        No results found for: CULT    No results found for: TOXA1, RPR, HBCM, HBSAG, HAAB, HCAB1, HAAT, G6PD, CRYAC, HIVGT, HIVR, HIV1, HIV12, HIVPC, HIVRPI    No results found for: VANCT, CPK    No results found for: COLOR, APPRN, SPGRU, KASSIE, PROTU, GLUCU, KETU, BILU, BLDU, UROU, MARICRUZ, LEUKU, WBCU, RBCU, UEPI, BACTU, CASTS, UCRY    IMPRESSION  · Acute hypoxic respiratory failure  · Chronic hypercapnia  · CAROLIN: not currently treated  · Hx CHF  · DM  · HTN      PLAN  · Continue O2 and wean as able to keep sats > 90%; not on home O2 at baseline  · Continue Bumex per Cardiology/primary team; watch creat and strict I/Os  · F/U ECHO  · Agree with chest CTA; will also get LE dopplers  · Rate control with metoprolol  · BiPAP nightly and with naps  · DVT prophylaxis: sub q heparin    Discussed with Hospitalist and Cardiology. Thank you for allowing us to participate in the care of this patient. We will be happy to follow along in his/her progress with you.     Tracey Braxton

## 2017-11-20 NOTE — PROGRESS NOTES
HYPOGLYCEMIC EPISODE DOCUMENTATION    Patient with hypoglycemic episode(s) at 0730 (time) on 11/20/17 (date). BG value(s) pre-treatment 68    Was patient symptomatic?  [] yes, [x] no  Patient was treated with the following rescue medications/treatments: [] D50                [] Glucose tablets                [] Glucagon                [x] 4oz juice                [] 6oz reg soda                [] 8oz low fat milk  BG value post-treatment: 100  Once BG treated and value greater than 80mg/dl, pt was provided with the following:  [x] snack  [] meal  Name of MD notified:none  The following orders were received: none

## 2017-11-20 NOTE — PROGRESS NOTES
[facility]  [address] [facility], [address] [place] [address]  [phone]      Medical Progress Note      NAME: Kapil Wong   :  1980  MRM:  [id number]    Date/Time: 2017         Subjective:     Chief Complaint:  Patient was seen and examined by me. Chart reviewed. Still with dyspnea. Required bipap       Objective:       Vitals:       Last 24hrs VS reviewed since prior progress note.  Most recent are:    Visit Vitals    /84    Pulse (!) 105    Temp 98.7 °F (37.1 °C)    Resp 18    Ht 5' 10\" (1.778 m)    Wt 100.9 kg (222 lb 7.1 oz)    SpO2 90%    BMI 31.92 kg/m2     SpO2 Readings from Last 6 Encounters:   17 90%    O2 Flow Rate (L/min): 6 l/min     Intake/Output Summary (Last 24 hours) at 17 1058  Last data filed at 17 0745   Gross per 24 hour   Intake             1070 ml   Output             4225 ml   Net            -3155 ml        Exam:     Physical Exam:    Gen:  Disheveled, obese, mild distress  HEENT:  Pink conjunctivae, PERRL, hearing intact to voice, moist mucous membranes  Neck:  Supple, without masses, thyroid non-tender  Resp:  No accessory muscle use, poor BS at bases  Card:  No murmurs, normal S1, S2 without thrills, trace edema  Abd:  Soft, non-tender, non-distended, normoactive bowel sounds are present  Musc:  No cyanosis or clubbing  Skin:  No rashes  Neuro:  Cranial nerves 3-12 are grossly intact, follows commands appropriately  Psych:  Good insight, oriented to person, place and time, alert    Medications Reviewed: (see below)    Lab Data Reviewed: (see below)    ______________________________________________________________________    Medications:     Current Facility-Administered Medications   Medication Dose Route Frequency    iopamidol (ISOVUE-370) 76 % injection 100 mL  100 mL IntraVENous RAD ONCE    insulin glargine (LANTUS) injection 10 Units  10 Units SubCUTAneous QHS    sodium chloride (NS) flush 5-10 mL  5-10 mL IntraVENous Q8H    sodium chloride (NS) flush 5-10 mL  5-10 mL IntraVENous PRN    acetaminophen (TYLENOL) tablet 650 mg  650 mg Oral Q4H PRN    morphine injection 2 mg  2 mg IntraVENous Q4H PRN    naloxone (NARCAN) injection 0.4 mg  0.4 mg IntraVENous PRN    diphenhydrAMINE (BENADRYL) injection 12.5 mg  12.5 mg IntraVENous Q4H PRN    prochlorperazine (COMPAZINE) injection 5 mg  5 mg IntraVENous Q8H PRN    LORazepam (ATIVAN) tablet 1 mg  1 mg Oral BID PRN    bumetanide (BUMEX) injection 1 mg  1 mg IntraVENous BID    insulin lispro (HUMALOG) injection   SubCUTAneous AC&HS    glucose chewable tablet 16 g  4 Tab Oral PRN    dextrose (D50W) injection syrg 12.5-25 g  12.5-25 g IntraVENous PRN    glucagon (GLUCAGEN) injection 1 mg  1 mg IntraMUSCular PRN    heparin (porcine) injection 5,000 Units  5,000 Units SubCUTAneous Q8H    aspirin delayed-release tablet 81 mg  81 mg Oral DAILY    dilTIAZem CD (CARDIZEM CD) capsule 240 mg  240 mg Oral DAILY    gemfibrozil (LOPID) tablet 600 mg  600 mg Oral BID    metoprolol succinate (TOPROL-XL) XL tablet 50 mg  50 mg Oral DAILY          Lab Review:     Recent Labs      11/20/17   0141  11/19/17   0250  11/18/17   1411   WBC  7.8  8.8  10.6   HGB  13.4  12.1  13.2   HCT  43.5  37.8  40.1   PLT  341  301  320     Recent Labs      11/20/17   0141  11/19/17   0250  11/18/17   1411   NA  141  140  133*   K  4.4  4.5  5.0   CL  101  101  92*   CO2  37*  36*  30   GLU  107*  81  384*   BUN  67*  73*  76*   CREA  1.31*  1.59*  2.08*   CA  9.2  8.7  8.9   MG  2.2   --    --    PHOS  4.5   --    --    ALB   --   2.4*  2.8*   TBILI   --   0.3  0.3   SGOT   --   10*  15   ALT   --   18  15     Lab Results   Component Value Date/Time    Glucose (POC) 169 11/20/2017 10:56 AM    Glucose (POC) 100 11/20/2017 07:55 AM    Glucose (POC) 73 11/20/2017 07:30 AM    Glucose (POC) 107 11/19/2017 09:25 PM    Glucose (POC) 115 11/19/2017 04:20 PM          Assessment / Plan:      Active Problems:    43 yo hx of HTN, DM, CAROLIN, presented w/ resp failure, severe hypoxia, CHF    1) Acute resp failure/severe hypoxia: O2 sat of 90% on 6L O2. Unclear etiology. Could be CHF, CAROLIN. V/Q neg. Will cont O2, bipap prn. Will check chest CTA to r/o PE. Consult Pulm    2) Acute CHF: unclear if systolic or diastolic. Echo pending. Cont IV bumex. Cards following    3) MANOHAR: could be cardio-renal syndrome. Improving with diuresis. Cont to monitor BMP    4) HTN: cont metoprolol, Dilt    5) Type 2 diabetes mellitus with renal complication: F2P 03.1%. Holding metformin. Cont Lantus, SSI. DM educator following    6) CAROLIN: has broken CPAP at home.   Cont O2    Total time spent with patient: 35 min                   Care Plan discussed with: Patient, nursing    Discussed:  Care Plan    Prophylaxis:  Lovenox    Disposition:   PT, OT, RN           ___________________________________________________    Attending Physician: Raina Granado MD

## 2017-11-20 NOTE — DIABETES MGMT
DTC Consult Note    Recommendations/ Comments: Consult received for assessment of home management; pt lives in [place]. He states he takes Lantus at home (it was not listed on PTA meds however) and his PCP in [place] had recently increased it. Pt thinks last A1c was 12%, so it is trending down. Pt with hypoglycemic event this AM- noted MD has decreased Lantus dose. Consult received for:  [x]             Assessment of home management               Chart reviewed and initial evaluation complete on Deedee Simon. Patient is a 44 y.o. male with known Type 2 Diabetes on 54 units Lantus/d and Metformin 1000 mg BID at home. BG monitoring at home 1 times per day. Patient reports BG levels at home fasting range: 160-170's- he states it is trending down since Lantus dose increased. Assessed and instructed patient on the following:   ·  interpretation of lab results, blood sugar goals, complications of diabetes mellitus, hypoglycemia prevention and treatment, nutrition, site rotation and self-injection of insulin    Encouraged the following:   · dietary modifications: pt states he's decreased fried foods and has been trying to eat better since getting last A1c at his PCP office- reviewed CHO goals and importance of balanced meals and encouraged him to continue    Provided patient with the following: [x]             Survival skills education materials        Discussed with patient and/or family need for follow up appointment for diabetes management after discharge.       A1c:   Lab Results   Component Value Date/Time    Hemoglobin A1c 11.3 11/18/2017 02:11 PM       Recent Glucose Results:   Lab Results   Component Value Date/Time     (H) 11/20/2017 01:41 AM    GLUCPOC 100 11/20/2017 07:55 AM    GLUCPOC 73 11/20/2017 07:30 AM    GLUCPOC 107 (H) 11/19/2017 09:25 PM        Lab Results   Component Value Date/Time    Creatinine 1.31 11/20/2017 01:41 AM     Estimated Creatinine Clearance: 91.1 mL/min (based on Cr of 1.31). Active Orders   Diet    DIET CARDIAC Regular        PO intake: No data found. Current hospital DM medication: Lantus 10 units; correctional Lispro- normal sensitivity     Will continue to follow as needed. Thank you. Angelina Greenberg RD, CDE

## 2017-11-20 NOTE — PROGRESS NOTES
0700: Bedside shift change report given to Sam Morales (oncoming nurse) by Pru (offgoing nurse). Report included the following information SBAR, Kardex and Recent Results. Shift Summary  0730 - 1100: Patient assessed. Currently on BiPAP, sats at 95%. Hypoglycemic episode (73) two orange juices administered. Recheck 100. Patient requesting that when he wakes up, he wants to try the NC. Will reassess. 1100 - 1500: Patient returned to unit from CT. After transferring to the bed, patients sats were as low as 67. Applied NC @ 6L, and sats came up to 94 after a few minutes. Encouraged patient to breath through nose and pace activities. Patient eating lunch. Will assess later. 1500 - 1900: Provided incentive spirometer. Patient demonstrated proper use. Current volume is 750. Encouraged to use 5 times every hour. Patient willing and able. Was using it when I left. 1930: Bedside shift change report given to Shital Garcia  (oncoming nurse) by Sam Morales (offgoing nurse). Report included the following information SBAR, Kardex and Recent Results.

## 2017-11-20 NOTE — PROGRESS NOTES
Problem: Falls - Risk of  Goal: *Absence of Falls  Document Felipe Fall Risk and appropriate interventions in the flowsheet.    Outcome: Progressing Towards Goal  Fall Risk Interventions:            Medication Interventions: Bed/chair exit alarm

## 2017-11-21 ENCOUNTER — APPOINTMENT (OUTPATIENT)
Dept: ULTRASOUND IMAGING | Age: 37
DRG: 286 | End: 2017-11-21
Attending: INTERNAL MEDICINE
Payer: COMMERCIAL

## 2017-11-21 LAB
ANION GAP SERPL CALC-SCNC: 7 MMOL/L (ref 5–15)
APPEARANCE UR: CLEAR
BACTERIA URNS QL MICRO: NEGATIVE /HPF
BILIRUB UR QL: NEGATIVE
BUN SERPL-MCNC: 66 MG/DL (ref 6–20)
BUN/CREAT SERPL: 42 (ref 12–20)
CALCIUM SERPL-MCNC: 9.2 MG/DL (ref 8.5–10.1)
CHLORIDE SERPL-SCNC: 98 MMOL/L (ref 97–108)
CO2 SERPL-SCNC: 36 MMOL/L (ref 21–32)
COLOR UR: ABNORMAL
CREAT SERPL-MCNC: 1.56 MG/DL (ref 0.7–1.3)
CREAT UR-MCNC: 66.13 MG/DL
EPITH CASTS URNS QL MICRO: ABNORMAL /LPF
GLUCOSE BLD STRIP.AUTO-MCNC: 170 MG/DL (ref 65–100)
GLUCOSE BLD STRIP.AUTO-MCNC: 171 MG/DL (ref 65–100)
GLUCOSE BLD STRIP.AUTO-MCNC: 307 MG/DL (ref 65–100)
GLUCOSE BLD STRIP.AUTO-MCNC: 76 MG/DL (ref 65–100)
GLUCOSE BLD STRIP.AUTO-MCNC: 87 MG/DL (ref 65–100)
GLUCOSE SERPL-MCNC: 112 MG/DL (ref 65–100)
GLUCOSE UR STRIP.AUTO-MCNC: NEGATIVE MG/DL
HGB UR QL STRIP: ABNORMAL
HYALINE CASTS URNS QL MICRO: ABNORMAL /LPF (ref 0–5)
KETONES UR QL STRIP.AUTO: NEGATIVE MG/DL
LEUKOCYTE ESTERASE UR QL STRIP.AUTO: NEGATIVE
MAGNESIUM SERPL-MCNC: 2.3 MG/DL (ref 1.6–2.4)
NITRITE UR QL STRIP.AUTO: NEGATIVE
PH UR STRIP: 5.5 [PH] (ref 5–8)
PHOSPHATE SERPL-MCNC: 5.7 MG/DL (ref 2.6–4.7)
POTASSIUM SERPL-SCNC: 5 MMOL/L (ref 3.5–5.1)
PROT UR STRIP-MCNC: 100 MG/DL
PROT UR-MCNC: 109 MG/DL (ref 0–11.9)
RBC #/AREA URNS HPF: ABNORMAL /HPF (ref 0–5)
SERVICE CMNT-IMP: ABNORMAL
SERVICE CMNT-IMP: NORMAL
SERVICE CMNT-IMP: NORMAL
SODIUM SERPL-SCNC: 141 MMOL/L (ref 136–145)
SP GR UR REFRACTOMETRY: 1.01 (ref 1–1.03)
UROBILINOGEN UR QL STRIP.AUTO: 0.2 EU/DL (ref 0.2–1)
WBC URNS QL MICRO: ABNORMAL /HPF (ref 0–4)

## 2017-11-21 PROCEDURE — 76770 US EXAM ABDO BACK WALL COMP: CPT

## 2017-11-21 PROCEDURE — 82962 GLUCOSE BLOOD TEST: CPT

## 2017-11-21 PROCEDURE — 82570 ASSAY OF URINE CREATININE: CPT | Performed by: INTERNAL MEDICINE

## 2017-11-21 PROCEDURE — 84100 ASSAY OF PHOSPHORUS: CPT | Performed by: INTERNAL MEDICINE

## 2017-11-21 PROCEDURE — 94010 BREATHING CAPACITY TEST: CPT

## 2017-11-21 PROCEDURE — 86160 COMPLEMENT ANTIGEN: CPT | Performed by: INTERNAL MEDICINE

## 2017-11-21 PROCEDURE — 74011250636 HC RX REV CODE- 250/636: Performed by: INTERNAL MEDICINE

## 2017-11-21 PROCEDURE — 86803 HEPATITIS C AB TEST: CPT | Performed by: INTERNAL MEDICINE

## 2017-11-21 PROCEDURE — 77010033678 HC OXYGEN DAILY

## 2017-11-21 PROCEDURE — 93308 TTE F-UP OR LMTD: CPT

## 2017-11-21 PROCEDURE — 74011250637 HC RX REV CODE- 250/637: Performed by: INTERNAL MEDICINE

## 2017-11-21 PROCEDURE — 74011636637 HC RX REV CODE- 636/637: Performed by: INTERNAL MEDICINE

## 2017-11-21 PROCEDURE — 87340 HEPATITIS B SURFACE AG IA: CPT | Performed by: INTERNAL MEDICINE

## 2017-11-21 PROCEDURE — 83883 ASSAY NEPHELOMETRY NOT SPEC: CPT | Performed by: INTERNAL MEDICINE

## 2017-11-21 PROCEDURE — 80048 BASIC METABOLIC PNL TOTAL CA: CPT | Performed by: INTERNAL MEDICINE

## 2017-11-21 PROCEDURE — 36415 COLL VENOUS BLD VENIPUNCTURE: CPT | Performed by: INTERNAL MEDICINE

## 2017-11-21 PROCEDURE — 93005 ELECTROCARDIOGRAM TRACING: CPT

## 2017-11-21 PROCEDURE — 84156 ASSAY OF PROTEIN URINE: CPT | Performed by: INTERNAL MEDICINE

## 2017-11-21 PROCEDURE — 81001 URINALYSIS AUTO W/SCOPE: CPT | Performed by: INTERNAL MEDICINE

## 2017-11-21 PROCEDURE — 94660 CPAP INITIATION&MGMT: CPT

## 2017-11-21 PROCEDURE — 65660000000 HC RM CCU STEPDOWN

## 2017-11-21 PROCEDURE — 86706 HEP B SURFACE ANTIBODY: CPT | Performed by: INTERNAL MEDICINE

## 2017-11-21 PROCEDURE — 83735 ASSAY OF MAGNESIUM: CPT | Performed by: INTERNAL MEDICINE

## 2017-11-21 PROCEDURE — 86335 IMMUNFIX E-PHORSIS/URINE/CSF: CPT | Performed by: INTERNAL MEDICINE

## 2017-11-21 RX ADMIN — ASPIRIN 81 MG: 81 TABLET, COATED ORAL at 08:41

## 2017-11-21 RX ADMIN — INSULIN GLARGINE 10 UNITS: 100 INJECTION, SOLUTION SUBCUTANEOUS at 21:24

## 2017-11-21 RX ADMIN — INSULIN LISPRO 2 UNITS: 100 INJECTION, SOLUTION INTRAVENOUS; SUBCUTANEOUS at 11:49

## 2017-11-21 RX ADMIN — METHYLPREDNISOLONE SODIUM SUCCINATE 40 MG: 40 INJECTION, POWDER, FOR SOLUTION INTRAMUSCULAR; INTRAVENOUS at 23:37

## 2017-11-21 RX ADMIN — Medication 10 ML: at 05:02

## 2017-11-21 RX ADMIN — Medication 10 ML: at 21:27

## 2017-11-21 RX ADMIN — INSULIN LISPRO 2 UNITS: 100 INJECTION, SOLUTION INTRAVENOUS; SUBCUTANEOUS at 16:58

## 2017-11-21 RX ADMIN — GEMFIBROZIL 600 MG: 600 TABLET ORAL at 08:41

## 2017-11-21 RX ADMIN — DILTIAZEM HYDROCHLORIDE 240 MG: 240 CAPSULE, COATED, EXTENDED RELEASE ORAL at 08:40

## 2017-11-21 RX ADMIN — ENOXAPARIN SODIUM 40 MG: 40 INJECTION SUBCUTANEOUS at 16:58

## 2017-11-21 RX ADMIN — GEMFIBROZIL 600 MG: 600 TABLET ORAL at 17:01

## 2017-11-21 RX ADMIN — METHYLPREDNISOLONE SODIUM SUCCINATE 40 MG: 40 INJECTION, POWDER, FOR SOLUTION INTRAMUSCULAR; INTRAVENOUS at 16:58

## 2017-11-21 RX ADMIN — METOPROLOL SUCCINATE 50 MG: 50 TABLET, FILM COATED, EXTENDED RELEASE ORAL at 08:40

## 2017-11-21 RX ADMIN — INSULIN LISPRO 4 UNITS: 100 INJECTION, SOLUTION INTRAVENOUS; SUBCUTANEOUS at 21:23

## 2017-11-21 NOTE — PROGRESS NOTES
SHIFT CHANGE:  1930 Bedside and Verbal shift change report given to Barbara KNIGHT (oncoming nurse) by Rian Gay (offgoing nurse). Report included the following information SBAR, Kardex, MAR and Recent Results. SHIFT SUMMARY:  7475  bipap in place. 0500  Patient expressed frustration on length of stay, concerned about need to get back to work. END OF SHIFT REPORT:'  0730  Bedside and Verbal shift change report given to Cody Torres (oncoming nurse) by [facility] (offgoing nurse). Report included the following information SBAR, Kardex, MAR and Cardiac Rhythm NSR.

## 2017-11-21 NOTE — CARDIO/PULMONARY
Cardiac Rehab: 11/21/2017 @ 1210:  Received cardiac rehab consult for CHF education. Heart Failure education folder, with Low Sodium brochure, given to Raul Engel. Prior to admission cardiac meds include: Amlodipine, Lasix, metoprolol succinate, Lopid, diltiazem, ASA 81 mg. Met with pt sitting at bedside on [facility] with high flow O2. Explained education role of cardiac rehab nurse. Attempted to discussed diagnosis definition and assessed patient understanding. Pt reported he is \"too depressed and I want to go home. \"  He reported he did not \"want to talk\" right now. Left CHF packet with pt. Cardiac rehab will follow  Smoking history assessed:  Never smoked. Raul Engel could benefit from further education on the following HF topics: all aspects. Sneha Warren

## 2017-11-21 NOTE — PROGRESS NOTES
Patient was not able to be wean off the High flow NC. Tried 6 lpm NC but his SATS drop to 87%.  He is back on the HFNC 20 Lpm 60%

## 2017-11-21 NOTE — PROGRESS NOTES
Inspiratory and Expiratory forces measured at bedside. Negative inspiratory force is < -60 cm h2o pressure. Maximal expiratory force was > +60 cm.  6 MWT was not attempted at this time as patient's O2 saturation is currently 86-89% while resting on nasal O2 at 6 lpm.  He was being transported to ultrasound on 100% non-rebreather mask.

## 2017-11-21 NOTE — PROGRESS NOTES
Kyle Quick ross [address] 78  [address] [facility], [address] [place] [address]  [phone]      Medical Progress Note      NAME: Leatha Current   :  1980  MRM:  [id number]    Date/Time: 2017         Subjective:     Chief Complaint:  Patient was seen and examined by me. Chart reviewed. Still very hypoxic, on 6L O2       Objective:       Vitals:       Last 24hrs VS reviewed since prior progress note.  Most recent are:    Visit Vitals    /81    Pulse 96    Temp 97.8 °F (36.6 °C)    Resp 17    Ht 5' 10\" (1.778 m)    Wt 98.5 kg (217 lb 2.5 oz)    SpO2 96%    BMI 31.16 kg/m2     SpO2 Readings from Last 6 Encounters:   17 96%    O2 Flow Rate (L/min): 6 l/min       Intake/Output Summary (Last 24 hours) at 17 1005  Last data filed at 17 0534   Gross per 24 hour   Intake              435 ml   Output             1450 ml   Net            -1015 ml        Exam:     Physical Exam:    Gen:  Disheveled, obese, mild distress  HEENT:  Pink conjunctivae, PERRL, hearing intact to voice, moist mucous membranes  Neck:  Supple, without masses, thyroid non-tender  Resp:  No accessory muscle use, poor BS at bases, some crackles at bases  Card:  No murmurs, normal S1, S2 without thrills, trace edema  Abd:  Soft, non-tender, non-distended, normoactive bowel sounds are present  Musc:  No cyanosis or clubbing  Skin:  No rashes  Neuro:  Cranial nerves 3-12 are grossly intact, follows commands appropriately  Psych:  Good insight, oriented to person, place and time, alert    Medications Reviewed: (see below)    Lab Data Reviewed: (see below)    ______________________________________________________________________    Medications:     Current Facility-Administered Medications   Medication Dose Route Frequency    enoxaparin (LOVENOX) injection 40 mg  40 mg SubCUTAneous Q24H    insulin glargine (LANTUS) injection 10 Units  10 Units SubCUTAneous QHS    sodium chloride (NS) flush 5-10 mL  5-10 mL IntraVENous Q8H    sodium chloride (NS) flush 5-10 mL  5-10 mL IntraVENous PRN    acetaminophen (TYLENOL) tablet 650 mg  650 mg Oral Q4H PRN    morphine injection 2 mg  2 mg IntraVENous Q4H PRN    naloxone (NARCAN) injection 0.4 mg  0.4 mg IntraVENous PRN    diphenhydrAMINE (BENADRYL) injection 12.5 mg  12.5 mg IntraVENous Q4H PRN    prochlorperazine (COMPAZINE) injection 5 mg  5 mg IntraVENous Q8H PRN    LORazepam (ATIVAN) tablet 1 mg  1 mg Oral BID PRN    insulin lispro (HUMALOG) injection   SubCUTAneous AC&HS    glucose chewable tablet 16 g  4 Tab Oral PRN    dextrose (D50W) injection syrg 12.5-25 g  12.5-25 g IntraVENous PRN    glucagon (GLUCAGEN) injection 1 mg  1 mg IntraMUSCular PRN    aspirin delayed-release tablet 81 mg  81 mg Oral DAILY    dilTIAZem CD (CARDIZEM CD) capsule 240 mg  240 mg Oral DAILY    gemfibrozil (LOPID) tablet 600 mg  600 mg Oral BID    metoprolol succinate (TOPROL-XL) XL tablet 50 mg  50 mg Oral DAILY          Lab Review:     Recent Labs      11/20/17   0141  11/19/17   0250  11/18/17   1411   WBC  7.8  8.8  10.6   HGB  13.4  12.1  13.2   HCT  43.5  37.8  40.1   PLT  341  301  320     Recent Labs      11/21/17   0135  11/20/17   0141  11/19/17   0250  11/18/17   1411   NA  141  141  140  133*   K  5.0  4.4  4.5  5.0   CL  98  101  101  92*   CO2  36*  37*  36*  30   GLU  112*  107*  81  384*   BUN  66*  67*  73*  76*   CREA  1.56*  1.31*  1.59*  2.08*   CA  9.2  9.2  8.7  8.9   MG  2.3  2.2   --    --    PHOS  5.7*  4.5   --    --    ALB   --    --   2.4*  2.8*   TBILI   --    --   0.3  0.3   SGOT   --    --   10*  15   ALT   --    --   18  15     Lab Results   Component Value Date/Time    Glucose (POC) 87 11/21/2017 07:44 AM    Glucose (POC) 76 11/21/2017 07:43 AM    Glucose (POC) 240 11/20/2017 08:38 PM    Glucose (POC) 146 11/20/2017 04:20 PM    Glucose (POC) 169 11/20/2017 10:56 AM          Assessment / Plan:      Active Problems:    45 yo hx of HTN, DM, CAROLIN, presented w/ resp failure, severe hypoxia, CHF    1) Acute resp failure/severe hypoxia: O2 sat of 90% on 6L O2, unable to wean. Large V/Q mismatch. Unclear etiology. Could be CHF, CAROLIN, pulm shunts. V/Q and chest CTA neg for PE. Will cont O2, bipap prn. Pulm following, plan for PFTs, bubble study    2) Acute CHF: diastolic, echo with EF 69-19%. Volume is stable. Will d/c IV bumex. Cont to monitor. Cards following    3) MANOHAR: could be cardio-renal syndrome vs progression of CKD. Will consult Renal    4) HTN: cont metoprolol, Dilt    5) Type 2 diabetes mellitus with renal complication: J2A 05.0%. Holding metformin. Cont Lantus, SSI. DM educator following    6) CAROLIN: has broken CPAP at home.   Cont O2    Total time spent with patient: 35 min                   Care Plan discussed with: Patient, nursing, pulm, renal    Discussed:  Care Plan    Prophylaxis:  Lovenox    Disposition:   PT, OT, RN           ___________________________________________________    Attending Physician: Maggi Irby MD

## 2017-11-21 NOTE — PROGRESS NOTES
Name: [facility]: [address] [place] [address] Dr GOEL: 1980 Admit Date: 2017   Phone:   Room: 324/01   PCP: Alexander South MD  MRN: [id number]   Date: 2017  Code: Full Code        Chart and notes reviewed. Data reviewed. I review the patient's current medications in the medical record at each encounter. I have evaluated and examined the patient. Overnight events  Afebrile  Sats 96% on BiPAP  95% on 6L  Creat 1.56 - likely elevated due to contrast   I/O: - 1615 ml  BLE VD negative for DVT  ECHO: EF 55-60%; mild TR  Only pulling 750-800 on IS  Bedside jean pierre with no obstruction, but FEV1 28 and FVC 27 suggesting restriction    ROS: Has no complaints this morning. Again, denies feeling SOB despite high O2 requirements. Denies fever, chills, or cough. Denies CP.   Denies abd pain or LE pain/swelling    Current Facility-Administered Medications   Medication Dose Route Frequency    enoxaparin (LOVENOX) injection 40 mg  40 mg SubCUTAneous Q24H    insulin glargine (LANTUS) injection 10 Units  10 Units SubCUTAneous QHS    sodium chloride (NS) flush 5-10 mL  5-10 mL IntraVENous Q8H    sodium chloride (NS) flush 5-10 mL  5-10 mL IntraVENous PRN    acetaminophen (TYLENOL) tablet 650 mg  650 mg Oral Q4H PRN    morphine injection 2 mg  2 mg IntraVENous Q4H PRN    naloxone (NARCAN) injection 0.4 mg  0.4 mg IntraVENous PRN    diphenhydrAMINE (BENADRYL) injection 12.5 mg  12.5 mg IntraVENous Q4H PRN    prochlorperazine (COMPAZINE) injection 5 mg  5 mg IntraVENous Q8H PRN    LORazepam (ATIVAN) tablet 1 mg  1 mg Oral BID PRN    insulin lispro (HUMALOG) injection   SubCUTAneous AC&HS    glucose chewable tablet 16 g  4 Tab Oral PRN    dextrose (D50W) injection syrg 12.5-25 g  12.5-25 g IntraVENous PRN    glucagon (GLUCAGEN) injection 1 mg  1 mg IntraMUSCular PRN    aspirin delayed-release tablet 81 mg  81 mg Oral DAILY    dilTIAZem CD (CARDIZEM CD) capsule 240 mg  240 mg Oral DAILY    gemfibrozil (LOPID) tablet 600 mg  600 mg Oral BID    metoprolol succinate (TOPROL-XL) XL tablet 50 mg  50 mg Oral DAILY         REVIEW OF SYSTEMS   12 point ROS negative except as stated in the HPI. Physical Exam:   Visit Vitals    /81    Pulse 96    Temp 97.8 °F (36.6 °C)    Resp 17    Ht 5' 10\" (1.778 m)    Wt 98.5 kg (217 lb 2.5 oz)    SpO2 96%    BMI 31.16 kg/m2       General:  Alert, cooperative, no distress, appears stated age. Head:  Normocephalic, without obvious abnormality, atraumatic. Eyes:  Conjunctivae/corneas clear. Nose: Nares normal. Septum midline. Mucosa normal.    Throat: Lips, mucosa, and tongue normal.    Neck: Supple, symmetrical, trachea midline, no adenopathy. Lungs:   Diminished, but clear to auscultation bilaterally. No wheeze or rales appreciated. Chest wall:  No tenderness or deformity. Heart:  Mildly tachycardic with rate 90s-100s, regular rhythm, no murmur, click, rub or gallop. Abdomen:   Soft, non-tender. Bowel sounds normal. No masses,  No organomegaly. Extremities: Extremities normal, atraumatic, no cyanosis, trace to 1+ LE edema. Pulses: 2+ and symmetric all extremities.    Skin: Skin color, texture, turgor normal. No rashes or lesions   Lymph nodes: Cervical, supraclavicular nodes normal.   Neurologic: Grossly nonfocal       Lab Results   Component Value Date/Time    Sodium 141 11/21/2017 01:35 AM    Potassium 5.0 11/21/2017 01:35 AM    Chloride 98 11/21/2017 01:35 AM    CO2 36 11/21/2017 01:35 AM    BUN 66 11/21/2017 01:35 AM    Creatinine 1.56 11/21/2017 01:35 AM    Glucose 112 11/21/2017 01:35 AM    Calcium 9.2 11/21/2017 01:35 AM    Magnesium 2.3 11/21/2017 01:35 AM    Phosphorus 5.7 11/21/2017 01:35 AM       Lab Results   Component Value Date/Time    WBC 7.8 11/20/2017 01:41 AM    HGB 13.4 11/20/2017 01:41 AM    PLATELET 827 95/64/4311 01:41 AM    MCV 86.7 11/20/2017 01:41 AM       Lab Results   Component Value Date/Time    AST (SGOT) 10 11/19/2017 02:50 AM    Alk. phosphatase 73 11/19/2017 02:50 AM    Protein, total 7.6 11/19/2017 02:50 AM    Albumin 2.4 11/19/2017 02:50 AM    Globulin 5.2 11/19/2017 02:50 AM       No results found for: IRON, FE, TIBC, IBCT, PSAT, FERR    No results found for: SR, CRP, YASMIN, ANAIGG, RA, RPR, RPRT, VDRLT, VDRLS, TSH, TSHEXT, TSHEXT     No results found for: PH, PHI, PCO2, PCO2I, PO2, PO2I, HCO3, HCO3I, FIO2, FIO2I    Lab Results   Component Value Date/Time    Troponin-I, Qt. <0.04 11/19/2017 02:50 AM        No results found for: CULT    No results found for: TOXA1, RPR, HBCM, HBSAG, HAAB, HCAB1, HAAT, G6PD, CRYAC, HIVGT, HIVR, HIV1, HIV12, HIVPC, HIVRPI    No results found for: VANCT, CPK    No results found for: COLOR, APPRN, SPGRU, KASSIE, PROTU, GLUCU, KETU, BILU, BLDU, UROU, MARICRUZ, LEUKU, WBCU, RBCU, UEPI, BACTU, CASTS, UCRY      Images: personally visualized    Chest CTA (11/21/17): No PE. There is atelectasis in the lower lobes. The upper lung zones are clear. No pulm edema or pleural effusions.      IMPRESSION  · Acute hypoxic respiratory failure  · Chronic hypercapnia  · CAROLIN: not currently treated  · Hx CHF  · DM  · HTN      PLAN  · Continue O2 and wean as able to keep sats > 90%; not on home O2 at baseline  · Bumex currently held primary team; watch creat and strict I/Os  · Check bubble study  · Check NIF and MIP/MEP  · Rate control with metoprolol; also on Cardizem for HTN  · BiPAP nightly and with naps  · IS/OOB to chair  · Needs complete PFTs outpatient  · DVT prophylaxis: sub q heparin    Discussed Cardiology.     Armen Schneider, [address]

## 2017-11-21 NOTE — PROGRESS NOTES
0945:  This patient's sat's are currently 87-89 on 6/L via NC. Patient placed on non-rebreather, sat's returned to 92/93. Will continue to monitor patient and notify the physician. Orders for hi-flow oxygen obtained. 6975: Pt left the floor for US on non-rebreather, pt's sat's at 94, prior to leaving. 1025: Patient returned to floor from [address] on non-rebreather. Respiratory present, placed patient on hi-flow oxygen. Patient's sat's 93/94 on hi-flow oxygen. Pt. stable, sitting on the side of the bed. Pt. encouraged to use the incentive spirometer. Will continue to monitor pt.

## 2017-11-21 NOTE — PROGRESS NOTES
Progress Note                                        Mustapha Berrios MD, [facility] [address], [place], [address] [place] [address] N[place]                                                 Phone [phone]; Fax [phone]        2017 9:52 PM     Admit Date:           2017  Admit Diagnosis:  SOB (shortness of breath)  :          1980   MRN:          [id number]   ASSESSMENT/RECOMMENDATION:   1)Acute on chronic respiratory failure:   - unable to wean oxygen, remains on 6 liters   -ECHO normal EF  - pulmonary following  - VQ mismatch  -VQ and CT chest - for PE  -plan for PFT's  - ECHO with bubble study today     2. Chronic diastolic CHF:   - now with MANOHAR   - renal to see   - stopped loops        3) DM w renal complications   -QUAD9C  11.3   -  DCT following      4. Sinus tach:   - HR       5) HTN  -cont dilt, toprol     6) CAROLIN  -cont CPAP     7) dyslipidemia  -follows in [facility] discussion regarding low oxygen saturations and why he needs to stay for workup. Edema is better. PATIENT was  Personally examined and chart reviewed. All the elements of history and examination were personally performed and I agree with the plan as listed above. Treatment plan was addressed with the patient. Chirag Barron MD                               Last 3 Recorded Weights in this Encounter    17 9217 17 0554 17 0519   Weight: 220 lb 7.4 oz (100 kg) 222 lb 7.1 oz (100.9 kg) 217 lb 2.5 oz (98.5 kg)          1901 -  0700  In: 705 [P.O.:705]  Out: [place] 5454 [Urine:4300]    SUBJECTIVE           Gabe Salmeron is a 44 y.o. male admitted with dyspnea and hypoxia,  noted PAOK. He is visiting from [facility] he has stated he has received treatment for CHF. CXR no CHF and BNP minimally elevated.   Symptoms include: dyspnea  Cardiac risk factors: dyslipidemia, diabetes mellitus, male gender, hypertension. Oxygen sat was 87% on arrival.  He has been having a cough and rhinorrhea for 4 days with fever as well and told the ER this but states now this is not the case. Pt states he takes Lasix BID and is compliant with his medication. He has a cardioloigst in [place]. Last admssion to hospital for similar sxs was in September. He has been here for two weeks and aunt making foods high in sodium. He had a stress test one year ago but unsure what his EF is . Hay Lopez reports dyspnea on exertion.       Current Facility-Administered Medications   Medication Dose Route Frequency    enoxaparin (LOVENOX) injection 40 mg  40 mg SubCUTAneous Q24H    insulin glargine (LANTUS) injection 10 Units  10 Units SubCUTAneous QHS    sodium chloride (NS) flush 5-10 mL  5-10 mL IntraVENous Q8H    sodium chloride (NS) flush 5-10 mL  5-10 mL IntraVENous PRN    acetaminophen (TYLENOL) tablet 650 mg  650 mg Oral Q4H PRN    morphine injection 2 mg  2 mg IntraVENous Q4H PRN    naloxone (NARCAN) injection 0.4 mg  0.4 mg IntraVENous PRN    diphenhydrAMINE (BENADRYL) injection 12.5 mg  12.5 mg IntraVENous Q4H PRN    prochlorperazine (COMPAZINE) injection 5 mg  5 mg IntraVENous Q8H PRN    LORazepam (ATIVAN) tablet 1 mg  1 mg Oral BID PRN    insulin lispro (HUMALOG) injection   SubCUTAneous AC&HS    glucose chewable tablet 16 g  4 Tab Oral PRN    dextrose (D50W) injection syrg 12.5-25 g  12.5-25 g IntraVENous PRN    glucagon (GLUCAGEN) injection 1 mg  1 mg IntraMUSCular PRN    aspirin delayed-release tablet 81 mg  81 mg Oral DAILY    dilTIAZem CD (CARDIZEM CD) capsule 240 mg  240 mg Oral DAILY    gemfibrozil (LOPID) tablet 600 mg  600 mg Oral BID    metoprolol succinate (TOPROL-XL) XL tablet 50 mg  50 mg Oral DAILY      OBJECTIVE               Intake/Output Summary (Last 24 hours) at 11/21/17 1022  Last data filed at 11/21/17 0534   Gross per 24 hour   Intake 435 ml   Output             1450 ml   Net            -1015 ml       Review of Systems - History obtained from the patient AS PER  HPI        PHYSICAL EXAM        Visit Vitals    /81    Pulse 96    Temp 97.8 °F (36.6 °C)    Resp 17    Ht 5' 10\" (1.778 m)    Wt 217 lb 2.5 oz (98.5 kg)    SpO2 96%    BMI 31.16 kg/m2       Gen: Well-developed, well-nourished, in no acute distress  alert and oriented x 3  HEENT:  Pink conjunctivae, Hearing grossly normal.No scleral icterus or conjunctival, moist mucous membranes  Neck: Supple,No JVD, No Carotid Bruit, Thyroid- non tender No cervical lymphadenopathy  Resp: No accessory muscle use, Clear breath sounds, No rales or rhonchi  Card: Regular Rate,Rythm,Normal S1, S2, No murmurs, rubs or gallop. No thrills.    MSK: No cyanosis or clubbing, good capillary refill  Skin: No rashes or ulcers, no bruising  Neuro:  Cranial nerves are grossly intact, moving all four extremities, no focal deficit, follows commands appropriately  Psych:  Good insight, oriented to person, place and time, alert, Nml Affect  LE: + 1 LE  edema       DATA REVIEW            Laboratory and Imaging have been reviewed by me and are notable for  Recent Labs      11/19/17   0250  11/18/17   2030  11/18/17   1411   TROIQ  <0.04  <0.04  <0.04     Recent Labs      11/21/17   0135  11/20/17   0141  11/19/17   0250  11/18/17   1411   NA  141  141  140  133*   K  5.0  4.4  4.5  5.0   CO2  36*  37*  36*  30   BUN  66*  67*  73*  76*   CREA  1.56*  1.31*  1.59*  2.08*   GLU  112*  107*  81  384*   PHOS  5.7*  4.5   --    --    MG  2.3  2.2   --    --    WBC   --   7.8  8.8  10.6   HGB   --   13.4  12.1  13.2   HCT   --   43.5  37.8  40.1   PLT   --   341  301  320             Carroll Jeong NP

## 2017-11-21 NOTE — CONSULTS
9 Kylah Vazquez  YOB: 1980     Assessment & Plan:   1. MANOHAR  · CR BETTER from > 2 to 1.3 and back up today  · Initial MANOHAR due to Diuresis/cardio renal, second insult due to contrast  · Hold Loops  · US,UA,PVR today  · Serologies  · CKD due to DM,HTN  2. Resp failure  · CTA neg for PE  · Large VQ Mismatch  · Hold loops as cr worse  · If hematuria, add Anti GMB  3. HTN  · BB,Dilt  · IF work up remains illusive, would dc BB and add RAAS inhibitors  4. DM  · +VE Retinopthy  5. CHF  · Normal EF,Diastolic chf             Subjective:   CHIEF COMPLAIN:sob  HPI:  44 yrs old AAM here to visit family ( he is from [place]) has been diagnosed with  acute resp failure/severe hypoxia: O2 sat of 90% on 6L O2, unable to wean. Large V/Q mismatch. His chest CTA was neg for PE. Will cont O2, bipap prn. He was told to have CHF when he was admitted in [place] in summer. He has normal EF,diiastolic, echo with EF 11-79%. His  IV bumex was stopped due to worsening cr. He denies seeing Nephrologist in past.  He denies NSAIDs. HE has DM,Retinopathy,laser surgery. He has HTN and on BB,CCB. HE has OAS AND not wearing CPAP at home. His DM control is not under control. He has edema and foamy urine. No bleeding. ROS neg for 12 sys except in HPI. Review of Systems  A comprehensive review of systems was negative except for that written in the HPI. Past Medical History:   Diagnosis Date    CHF (congestive heart failure) ([facility] [address]     DM type 2 (diabetes mellitus, type 2) (formerly [facility])     HTN (hypertension), benign     Hyperlipidemia     Sleep apnea       No past surgical history on file. Social History     Social History    Marital status: SINGLE     Spouse name: N/A    Number of children: N/A    Years of education: N/A     Occupational History    Not on file.      Social History Main Topics    Smoking status: Not on file    Smokeless tobacco: Not on file    Alcohol use Not on file    Drug use: Not on file    Sexual activity: Not on file     Other Topics Concern    Not on file     Social History Narrative    No narrative on file      Family History   Problem Relation Age of Onset    Heart Disease Neg Hx       Prior to Admission medications    Medication Sig Start Date End Date Taking? Authorizing Provider   metoprolol succinate (TOPROL-XL) 50 mg XL tablet Take 50 mg by mouth daily. Yes Historical Provider   amLODIPine (NORVASC) 10 mg tablet Take 10 mg by mouth daily. Yes Historical Provider   gemfibrozil (LOPID) 600 mg tablet Take 600 mg by mouth two (2) times a day. Yes Historical Provider   metFORMIN (GLUCOPHAGE) 1,000 mg tablet Take 1,000 mg by mouth two (2) times daily (with meals). Yes Historical Provider   dilTIAZem CD (CARDIZEM CD) 240 mg ER capsule Take 240 mg by mouth daily. Yes Historical Provider   furosemide (LASIX) 40 mg tablet Take 40 mg by mouth two (2) times a day. Yes Historical Provider   aspirin delayed-release 81 mg tablet Take 81 mg by mouth daily. Yes Historical Provider     No Known Allergies    Objective:     Vitals:  Blood pressure 117/81, pulse 96, temperature 97.8 °F (36.6 °C), resp. rate 17, height 5' 10\" (1.778 m), weight 98.5 kg (217 lb 2.5 oz), SpO2 96 %.   Temp (24hrs), Av.5 °F (36.9 °C), Min:97.8 °F (36.6 °C), Max:98.8 °F (37.1 °C)      Intake and Output:      1901 -  0700  In: 705 [P.O.:705]  Out: 8152 [Urine:4300]    Physical Exam:                Patient is intubated:  no    Physical Examination:   GENERAL ASSESSMENT: well developed and well nourished  SKIN: normal color, no lesions  HEAD: normocephalic  EYES: normal eyes  NECK: normal  CHEST: normal air exchange, rales bl, rhonchi bl  HEART: regular rate and rhythm, normal S1/S2, no murmurs  ABDOMEN: soft, non-distended, no masses, no hepatosplenomegaly  :Pedersen: no  EXTREMITY: normal and symmetric movement, normal range of motion, no joint swelling, 1 edema  NEURO: gross motor exam normal by observation      ECG/rhythm[de-identified] Rev:yes  Xray/CT/US/MRI REV:yes  Data Review   Recent Results (from the past 72 hour(s))   EKG, 12 LEAD, INITIAL    Collection Time: 11/18/17  2:03 PM   Result Value Ref Range    Ventricular Rate 125 BPM    Atrial Rate 125 BPM    P-R Interval 168 ms    QRS Duration 74 ms    Q-T Interval 286 ms    QTC Calculation (Bezet) 412 ms    Calculated P Axis 51 degrees    Calculated R Axis 69 degrees    Calculated T Axis 27 degrees    Diagnosis       Sinus tachycardia  Possible Left atrial enlargement  Borderline ECG  No previous ECGs available  Confirmed by Jennifer Lynch MD. ([id number]) on 11/20/2017 8:59:52 AM     CBC WITH AUTOMATED DIFF    Collection Time: 11/18/17  2:11 PM   Result Value Ref Range    WBC 10.6 4.1 - 11.1 K/uL    RBC 4.78 4.10 - 5.70 M/uL    HGB 13.2 12.1 - 17.0 g/dL    HCT 40.1 36.6 - 50.3 %    MCV 83.9 80.0 - 99.0 FL    MCH 27.6 26.0 - 34.0 PG    MCHC 32.9 30.0 - 36.5 g/dL    RDW 13.6 11.5 - 14.5 %    PLATELET 416 762 - 630 K/uL    NEUTROPHILS 75 32 - 75 %    LYMPHOCYTES 17 12 - 49 %    MONOCYTES 7 5 - 13 %    EOSINOPHILS 1 0 - 7 %    BASOPHILS 0 0 - 1 %    ABS. NEUTROPHILS 8.0 1.8 - 8.0 K/UL    ABS. LYMPHOCYTES 1.8 0.8 - 3.5 K/UL    ABS. MONOCYTES 0.7 0.0 - 1.0 K/UL    ABS. EOSINOPHILS 0.1 0.0 - 0.4 K/UL    ABS.  BASOPHILS 0.0 0.0 - 0.1 K/UL    DF SMEAR SCANNED      RBC COMMENTS NORMOCYTIC, NORMOCHROMIC     METABOLIC PANEL, COMPREHENSIVE    Collection Time: 11/18/17  2:11 PM   Result Value Ref Range    Sodium 133 (L) 136 - 145 mmol/L    Potassium 5.0 3.5 - 5.1 mmol/L    Chloride 92 (L) 97 - 108 mmol/L    CO2 30 21 - 32 mmol/L    Anion gap 11 5 - 15 mmol/L    Glucose 384 (H) 65 - 100 mg/dL    BUN 76 (H) 6 - 20 MG/DL    Creatinine 2.08 (H) 0.70 - 1.30 MG/DL    BUN/Creatinine ratio 37 (H) 12 - 20      GFR est AA 44 (L) >60 ml/min/1.73m2    GFR est non-AA 36 (L) >60 ml/min/1.73m2    Calcium 8.9 8.5 - 10.1 MG/DL    Bilirubin, total 0.3 0.2 - 1.0 MG/DL    ALT (SGPT) 15 12 - 78 U/L    AST (SGOT) 15 15 - 37 U/L    Alk. phosphatase 82 45 - 117 U/L    Protein, total 8.3 (H) 6.4 - 8.2 g/dL    Albumin 2.8 (L) 3.5 - 5.0 g/dL    Globulin 5.5 (H) 2.0 - 4.0 g/dL    A-G Ratio 0.5 (L) 1.1 - 2.2     TROPONIN I    Collection Time: 11/18/17  2:11 PM   Result Value Ref Range    Troponin-I, Qt. <0.04 <0.05 ng/mL   NT-PRO BNP    Collection Time: 11/18/17  2:11 PM   Result Value Ref Range    NT pro-BNP 1867 (H) 0 - 125 PG/ML   D DIMER    Collection Time: 11/18/17  2:11 PM   Result Value Ref Range    D-dimer 0.73 (H) 0.00 - 0.65 mg/L FEU   NUCLEATED RBC    Collection Time: 11/18/17  2:11 PM   Result Value Ref Range    NRBC 1.1 (H) 0  WBC    ABSOLUTE NRBC 0.11 (H) 0.00 - 0.01 K/uL    WBC CORRECTED FOR NR ADJUSTED FOR NUCLEATED RBC'S     HEMOGLOBIN A1C WITH EAG    Collection Time: 11/18/17  2:11 PM   Result Value Ref Range    Hemoglobin A1c 11.3 (H) 4.2 - 6.3 %    Est. average glucose 278 mg/dL   BLOOD GAS, ARTERIAL    Collection Time: 11/18/17  2:18 PM   Result Value Ref Range    pH 7.25 (L) 7.35 - 7.45      PCO2 72 (H) 35.0 - 45.0 mmHg    PO2 120 (H) 80 - 100 mmHg    O2 SAT 98 (H) 92 - 97 %    BICARBONATE 31 (H) 22 - 26 mmol/L    BASE EXCESS 1.3 mmol/L    O2 METHOD NRM      FIO2 100 %    Sample source ARTERIAL      SITE RIGHT RADIAL      MONI'S TEST YES      Critical value read back ELISEO  [address] MD Keith    DRUG SCREEN, URINE    Collection Time: 11/18/17  5:17 PM   Result Value Ref Range    AMPHETAMINES NEGATIVE  NEG      BARBITURATES NEGATIVE  NEG      BENZODIAZEPINES NEGATIVE  NEG      COCAINE NEGATIVE  NEG      METHADONE NEGATIVE  NEG      OPIATES NEGATIVE  NEG      PCP(PHENCYCLIDINE) NEGATIVE  NEG      THC (TH-CANNABINOL) NEGATIVE  NEG      Drug screen comment (NOTE)    TROPONIN I    Collection Time: 11/18/17  8:30 PM   Result Value Ref Range    Troponin-I, Qt. <0.04 <0.05 ng/mL   GLUCOSE, POC    Collection Time: 11/18/17  9:05 PM   Result Value Ref Range    Glucose (POC) 178 (H) 65 - 100 mg/dL    Performed by Galen Campos (PCT)    METABOLIC PANEL, COMPREHENSIVE    Collection Time: 11/19/17  2:50 AM   Result Value Ref Range    Sodium 140 136 - 145 mmol/L    Potassium 4.5 3.5 - 5.1 mmol/L    Chloride 101 97 - 108 mmol/L    CO2 36 (H) 21 - 32 mmol/L    Anion gap 3 (L) 5 - 15 mmol/L    Glucose 81 65 - 100 mg/dL    BUN 73 (H) 6 - 20 MG/DL    Creatinine 1.59 (H) 0.70 - 1.30 MG/DL    BUN/Creatinine ratio 46 (H) 12 - 20      GFR est AA 59 (L) >60 ml/min/1.73m2    GFR est non-AA 49 (L) >60 ml/min/1.73m2    Calcium 8.7 8.5 - 10.1 MG/DL    Bilirubin, total 0.3 0.2 - 1.0 MG/DL    ALT (SGPT) 18 12 - 78 U/L    AST (SGOT) 10 (L) 15 - 37 U/L    Alk. phosphatase 73 45 - 117 U/L    Protein, total 7.6 6.4 - 8.2 g/dL    Albumin 2.4 (L) 3.5 - 5.0 g/dL    Globulin 5.2 (H) 2.0 - 4.0 g/dL    A-G Ratio 0.5 (L) 1.1 - 2.2     CBC WITH AUTOMATED DIFF    Collection Time: 11/19/17  2:50 AM   Result Value Ref Range    WBC 8.8 4.1 - 11.1 K/uL    RBC 4.45 4.10 - 5.70 M/uL    HGB 12.1 12.1 - 17.0 g/dL    HCT 37.8 36.6 - 50.3 %    MCV 84.9 80.0 - 99.0 FL    MCH 27.2 26.0 - 34.0 PG    MCHC 32.0 30.0 - 36.5 g/dL    RDW 13.6 11.5 - 14.5 %    PLATELET 160 874 - 566 K/uL    NEUTROPHILS 67 32 - 75 %    LYMPHOCYTES 21 12 - 49 %    MONOCYTES 9 5 - 13 %    EOSINOPHILS 3 0 - 7 %    BASOPHILS 0 0 - 1 %    ABS. NEUTROPHILS 5.9 1.8 - 8.0 K/UL    ABS. LYMPHOCYTES 1.8 0.8 - 3.5 K/UL    ABS. MONOCYTES 0.8 0.0 - 1.0 K/UL    ABS. EOSINOPHILS 0.3 0.0 - 0.4 K/UL    ABS.  BASOPHILS 0.0 0.0 - 0.1 K/UL    PLATELET COMMENTS LARGE PLATELETS      RBC COMMENTS NORMOCYTIC, NORMOCHROMIC      DF SMEAR SCANNED     TROPONIN I    Collection Time: 11/19/17  2:50 AM   Result Value Ref Range    Troponin-I, Qt. <0.04 <0.05 ng/mL   NUCLEATED RBC    Collection Time: 11/19/17  2:50 AM   Result Value Ref Range    NRBC 1.5 (H) 0  WBC    ABSOLUTE NRBC 0.13 (H) 0.00 - 0.01 K/uL    WBC CORRECTED FOR NR ADJUSTED FOR NUCLEATED RBC'S     NT-PRO BNP Collection Time: 11/19/17  2:50 AM   Result Value Ref Range    NT pro-BNP 1714 (H) 0 - 125 PG/ML   BLOOD GAS, ARTERIAL    Collection Time: 11/19/17  6:35 AM   Result Value Ref Range    pH 7.26 (L) 7.35 - 7.45      PCO2 79 (H) 35.0 - 45.0 mmHg    PO2 83 80 - 100 mmHg    O2 SAT 94 92 - 97 %    BICARBONATE 35 (H) 22 - 26 mmol/L    BASE EXCESS 4.7 mmol/L    O2 METHOD BIPAP      FIO2 80 %    MODE BIPAP      SET RATE 8      IPAP/PIP 12.0      EPAP/CPAP/PEEP 6.0      Sample source ARTERIAL      SITE LEFT RADIAL      MONI'S TEST YES     GLUCOSE, POC    Collection Time: 11/19/17  7:30 AM   Result Value Ref Range    Glucose (POC) 96 65 - 100 mg/dL    Performed by Nii Cordero ([facility])    GLUCOSE, POC    Collection Time: 11/19/17 11:28 AM   Result Value Ref Range    Glucose (POC) 149 (H) 65 - 100 mg/dL    Performed by Nii Cordero ([facility])    GLUCOSE, POC    Collection Time: 11/19/17  4:20 PM   Result Value Ref Range    Glucose (POC) 115 (H) 65 - 100 mg/dL    Performed by Nii Cordero ([facility])    GLUCOSE, POC    Collection Time: 11/19/17  9:25 PM   Result Value Ref Range    Glucose (POC) 107 (H) 65 - 100 mg/dL    Performed by Bria Munoz (PCT)    CBC W/O DIFF    Collection Time: 11/20/17  1:41 AM   Result Value Ref Range    WBC 7.8 4.1 - 11.1 K/uL    RBC 5.02 4.10 - 5.70 M/uL    HGB 13.4 12.1 - 17.0 g/dL    HCT 43.5 36.6 - 50.3 %    MCV 86.7 80.0 - 99.0 FL    MCH 26.7 26.0 - 34.0 PG    MCHC 30.8 30.0 - 36.5 g/dL    RDW 13.7 11.5 - 14.5 %    PLATELET 049 368 - 514 K/uL   METABOLIC PANEL, BASIC    Collection Time: 11/20/17  1:41 AM   Result Value Ref Range    Sodium 141 136 - 145 mmol/L    Potassium 4.4 3.5 - 5.1 mmol/L    Chloride 101 97 - 108 mmol/L    CO2 37 (H) 21 - 32 mmol/L    Anion gap 3 (L) 5 - 15 mmol/L    Glucose 107 (H) 65 - 100 mg/dL    BUN 67 (H) 6 - 20 MG/DL    Creatinine 1.31 (H) 0.70 - 1.30 MG/DL    BUN/Creatinine ratio 51 (H) 12 - 20      GFR est AA >60 >60 ml/min/1.73m2    GFR est non-AA >60 >60 ml/min/1.73m2    Calcium 9.2 8.5 - 10.1 MG/DL   MAGNESIUM    Collection Time: 11/20/17  1:41 AM   Result Value Ref Range    Magnesium 2.2 1.6 - 2.4 mg/dL   PHOSPHORUS    Collection Time: 11/20/17  1:41 AM   Result Value Ref Range    Phosphorus 4.5 2.6 - 4.7 MG/DL   GLUCOSE, POC    Collection Time: 11/20/17  7:30 AM   Result Value Ref Range    Glucose (POC) 73 65 - 100 mg/dL    Performed by Nuno Atkins ([facility])    GLUCOSE, POC    Collection Time: 11/20/17  7:55 AM   Result Value Ref Range    Glucose (POC) 100 65 - 100 mg/dL    Performed by [address] [place] [address], POC    Collection Time: 11/20/17 10:56 AM   Result Value Ref Range    Glucose (POC) 169 (H) 65 - 100 mg/dL    Performed by Nuno Atkins ([facility])    GLUCOSE, POC    Collection Time: 11/20/17  4:20 PM   Result Value Ref Range    Glucose (POC) 146 (H) 65 - 100 mg/dL    Performed by Nuno Atkins ([facility])    GLUCOSE, POC    Collection Time: 11/20/17  8:38 PM   Result Value Ref Range    Glucose (POC) 240 (H) 65 - 100 mg/dL    Performed by Wilda Candelario ([facility])    METABOLIC PANEL, BASIC    Collection Time: 11/21/17  1:35 AM   Result Value Ref Range    Sodium 141 136 - 145 mmol/L    Potassium 5.0 3.5 - 5.1 mmol/L    Chloride 98 97 - 108 mmol/L    CO2 36 (H) 21 - 32 mmol/L    Anion gap 7 5 - 15 mmol/L    Glucose 112 (H) 65 - 100 mg/dL    BUN 66 (H) 6 - 20 MG/DL    Creatinine 1.56 (H) 0.70 - 1.30 MG/DL    BUN/Creatinine ratio 42 (H) 12 - 20      GFR est AA >60 >60 ml/min/1.73m2    GFR est non-AA 50 (L) >60 ml/min/1.73m2    Calcium 9.2 8.5 - 10.1 MG/DL   MAGNESIUM    Collection Time: 11/21/17  1:35 AM   Result Value Ref Range    Magnesium 2.3 1.6 - 2.4 mg/dL   PHOSPHORUS    Collection Time: 11/21/17  1:35 AM   Result Value Ref Range    Phosphorus 5.7 (H) 2.6 - 4.7 MG/DL   GLUCOSE, POC    Collection Time: 11/21/17  7:43 AM   Result Value Ref Range    Glucose (POC) 76 65 - 100 mg/dL    Performed by Fadumo Lilly, POC    Collection Time: 11/21/17 7:44 AM   Result Value Ref Range    Glucose (POC) 87 65 - 100 mg/dL    Performed by Jazmin Enamorado        Discussed with:    Patient, Nurse and Attending/Consulting  Thank you so much to allow us to participate in this patient's care. We will follow.  : Claire Doyle MD  11/21/2017      [facility]:  [email or web address]  Silvia Arteaga office:  [place] [address] [place] [address],[address]  [address]  Phone: [phone]  Fax :     [phone]    [facility] office:  [address] [place] [address], [facility]  Phone - [phone]  Fax - [phone]

## 2017-11-22 LAB
ANION GAP SERPL CALC-SCNC: 5 MMOL/L (ref 5–15)
ANION GAP SERPL CALC-SCNC: 8 MMOL/L (ref 5–15)
ATRIAL RATE: 90 BPM
BUN SERPL-MCNC: 74 MG/DL (ref 6–20)
BUN SERPL-MCNC: 79 MG/DL (ref 6–20)
BUN/CREAT SERPL: 37 (ref 12–20)
BUN/CREAT SERPL: 39 (ref 12–20)
C3 SERPL-MCNC: 222 MG/DL (ref 82–167)
C4 SERPL-MCNC: 63 MG/DL (ref 14–44)
CALCIUM SERPL-MCNC: 9.2 MG/DL (ref 8.5–10.1)
CALCIUM SERPL-MCNC: 9.4 MG/DL (ref 8.5–10.1)
CALCULATED P AXIS, ECG09: 40 DEGREES
CALCULATED R AXIS, ECG10: 110 DEGREES
CALCULATED T AXIS, ECG11: 56 DEGREES
CHLORIDE SERPL-SCNC: 95 MMOL/L (ref 97–108)
CHLORIDE SERPL-SCNC: 99 MMOL/L (ref 97–108)
CO2 SERPL-SCNC: 33 MMOL/L (ref 21–32)
CO2 SERPL-SCNC: 33 MMOL/L (ref 21–32)
COHGB MFR BLD: 0.7 % (ref 1–2)
COHGB MFR BLD: 1 % (ref 1–2)
COHGB MFR BLD: 1.1 % (ref 1–2)
COHGB MFR BLD: 1.4 % (ref 1–2)
COHGB MFR BLD: 1.5 % (ref 1–2)
CREAT SERPL-MCNC: 2 MG/DL (ref 0.7–1.3)
CREAT SERPL-MCNC: 2.01 MG/DL (ref 0.7–1.3)
DIAGNOSIS, 93000: NORMAL
GLUCOSE BLD STRIP.AUTO-MCNC: 291 MG/DL (ref 65–100)
GLUCOSE BLD STRIP.AUTO-MCNC: 300 MG/DL (ref 65–100)
GLUCOSE BLD STRIP.AUTO-MCNC: 343 MG/DL (ref 65–100)
GLUCOSE BLD STRIP.AUTO-MCNC: 358 MG/DL (ref 65–100)
GLUCOSE BLD STRIP.AUTO-MCNC: 440 MG/DL (ref 65–100)
GLUCOSE SERPL-MCNC: 304 MG/DL (ref 65–100)
GLUCOSE SERPL-MCNC: 408 MG/DL (ref 65–100)
HBV SURFACE AB SER QL: REACTIVE
HBV SURFACE AB SER-ACNC: 18.25 MIU/ML
HBV SURFACE AG SER QL: <0.1 INDEX
HBV SURFACE AG SER QL: NEGATIVE
HCV AB SERPL QL IA: NONREACTIVE
HCV COMMENT,HCGAC: NORMAL
HGB BLD OXIMETRY-MCNC: 13.3 G/DL (ref 14–17)
HGB BLD OXIMETRY-MCNC: 14.5 G/DL (ref 14–17)
HGB BLD OXIMETRY-MCNC: 15 G/DL (ref 14–17)
HGB BLD OXIMETRY-MCNC: 18.7 G/DL (ref 14–17)
HGB BLD OXIMETRY-MCNC: 9.1 G/DL (ref 14–17)
KAPPA LC FREE SER-MCNC: 92.4 MG/L (ref 3.3–19.4)
KAPPA LC FREE/LAMBDA FREE SER: 2.04 {RATIO} (ref 0.26–1.65)
LAMBDA LC FREE SERPL-MCNC: 45.4 MG/L (ref 5.7–26.3)
MAGNESIUM SERPL-MCNC: 2.5 MG/DL (ref 1.6–2.4)
METHGB MFR BLD: 0 % (ref 0–1.4)
METHGB MFR BLD: 0.3 % (ref 0–1.4)
OXYHGB MFR BLD: 76.9 % (ref 94–97)
OXYHGB MFR BLD: 77.1 % (ref 94–97)
OXYHGB MFR BLD: 81.8 % (ref 94–97)
OXYHGB MFR BLD: 84.8 % (ref 94–97)
OXYHGB MFR BLD: 84.8 % (ref 94–97)
P-R INTERVAL, ECG05: 200 MS
PHOSPHATE SERPL-MCNC: 4.9 MG/DL (ref 2.6–4.7)
POTASSIUM SERPL-SCNC: 5 MMOL/L (ref 3.5–5.1)
POTASSIUM SERPL-SCNC: 6.1 MMOL/L (ref 3.5–5.1)
Q-T INTERVAL, ECG07: 376 MS
QRS DURATION, ECG06: 88 MS
QTC CALCULATION (BEZET), ECG08: 459 MS
SAO2 % BLD: 78 % (ref 95–99)
SAO2 % BLD: 78 % (ref 95–99)
SAO2 % BLD: 83 % (ref 95–99)
SAO2 % BLD: 86 % (ref 95–99)
SAO2 % BLD: 86 % (ref 95–99)
SERVICE CMNT-IMP: ABNORMAL
SODIUM SERPL-SCNC: 136 MMOL/L (ref 136–145)
SODIUM SERPL-SCNC: 137 MMOL/L (ref 136–145)
VENTRICULAR RATE, ECG03: 90 BPM

## 2017-11-22 PROCEDURE — 80048 BASIC METABOLIC PNL TOTAL CA: CPT | Performed by: INTERNAL MEDICINE

## 2017-11-22 PROCEDURE — 83735 ASSAY OF MAGNESIUM: CPT | Performed by: INTERNAL MEDICINE

## 2017-11-22 PROCEDURE — C1751 CATH, INF, PER/CENT/MIDLINE: HCPCS

## 2017-11-22 PROCEDURE — 36415 COLL VENOUS BLD VENIPUNCTURE: CPT | Performed by: INTERNAL MEDICINE

## 2017-11-22 PROCEDURE — 74011250637 HC RX REV CODE- 250/637: Performed by: INTERNAL MEDICINE

## 2017-11-22 PROCEDURE — 86256 FLUORESCENT ANTIBODY TITER: CPT | Performed by: INTERNAL MEDICINE

## 2017-11-22 PROCEDURE — C1894 INTRO/SHEATH, NON-LASER: HCPCS

## 2017-11-22 PROCEDURE — 82962 GLUCOSE BLOOD TEST: CPT

## 2017-11-22 PROCEDURE — 74011000250 HC RX REV CODE- 250: Performed by: SPECIALIST

## 2017-11-22 PROCEDURE — 65660000000 HC RM CCU STEPDOWN

## 2017-11-22 PROCEDURE — 74011250636 HC RX REV CODE- 250/636: Performed by: SPECIALIST

## 2017-11-22 PROCEDURE — 74011636637 HC RX REV CODE- 636/637: Performed by: INTERNAL MEDICINE

## 2017-11-22 PROCEDURE — 77030029065 HC DRSG HEMO QCLOT ZMED -B

## 2017-11-22 PROCEDURE — 93451 RIGHT HEART CATH: CPT

## 2017-11-22 PROCEDURE — 74011250636 HC RX REV CODE- 250/636: Performed by: INTERNAL MEDICINE

## 2017-11-22 PROCEDURE — 4A023N6 MEASUREMENT OF CARDIAC SAMPLING AND PRESSURE, RIGHT HEART, PERCUTANEOUS APPROACH: ICD-10-PCS | Performed by: SPECIALIST

## 2017-11-22 PROCEDURE — 86038 ANTINUCLEAR ANTIBODIES: CPT | Performed by: INTERNAL MEDICINE

## 2017-11-22 PROCEDURE — 82375 ASSAY CARBOXYHB QUANT: CPT | Performed by: SPECIALIST

## 2017-11-22 PROCEDURE — 77030018836 HC SOL IRR NACL ICUM -A

## 2017-11-22 PROCEDURE — 84100 ASSAY OF PHOSPHORUS: CPT | Performed by: INTERNAL MEDICINE

## 2017-11-22 PROCEDURE — 83516 IMMUNOASSAY NONANTIBODY: CPT | Performed by: INTERNAL MEDICINE

## 2017-11-22 RX ORDER — LIDOCAINE HYDROCHLORIDE 10 MG/ML
10-20 INJECTION INFILTRATION; PERINEURAL
Status: DISCONTINUED | OUTPATIENT
Start: 2017-11-22 | End: 2017-11-22 | Stop reason: HOSPADM

## 2017-11-22 RX ORDER — SODIUM POLYSTYRENE SULFONATE 15 G/60ML
30 SUSPENSION ORAL; RECTAL ONCE
Status: COMPLETED | OUTPATIENT
Start: 2017-11-22 | End: 2017-11-22

## 2017-11-22 RX ORDER — HEPARIN SODIUM 200 [USP'U]/100ML
500 INJECTION, SOLUTION INTRAVENOUS ONCE
Status: COMPLETED | OUTPATIENT
Start: 2017-11-22 | End: 2017-11-22

## 2017-11-22 RX ORDER — MIDAZOLAM HYDROCHLORIDE 1 MG/ML
.5-1 INJECTION, SOLUTION INTRAMUSCULAR; INTRAVENOUS
Status: DISCONTINUED | OUTPATIENT
Start: 2017-11-22 | End: 2017-11-22 | Stop reason: HOSPADM

## 2017-11-22 RX ORDER — INSULIN GLARGINE 100 [IU]/ML
15 INJECTION, SOLUTION SUBCUTANEOUS
Status: DISCONTINUED | OUTPATIENT
Start: 2017-11-22 | End: 2017-11-27 | Stop reason: HOSPADM

## 2017-11-22 RX ORDER — FENTANYL CITRATE 50 UG/ML
25-200 INJECTION, SOLUTION INTRAMUSCULAR; INTRAVENOUS
Status: DISCONTINUED | OUTPATIENT
Start: 2017-11-22 | End: 2017-11-22 | Stop reason: HOSPADM

## 2017-11-22 RX ORDER — SODIUM CHLORIDE 9 MG/ML
75 INJECTION, SOLUTION INTRAVENOUS CONTINUOUS
Status: DISCONTINUED | OUTPATIENT
Start: 2017-11-22 | End: 2017-11-24

## 2017-11-22 RX ORDER — INSULIN LISPRO 100 [IU]/ML
10 INJECTION, SOLUTION INTRAVENOUS; SUBCUTANEOUS ONCE
Status: COMPLETED | OUTPATIENT
Start: 2017-11-22 | End: 2017-11-22

## 2017-11-22 RX ADMIN — SODIUM POLYSTYRENE SULFONATE 30 G: 15 SUSPENSION ORAL; RECTAL at 09:13

## 2017-11-22 RX ADMIN — INSULIN LISPRO 10 UNITS: 100 INJECTION, SOLUTION INTRAVENOUS; SUBCUTANEOUS at 12:16

## 2017-11-22 RX ADMIN — Medication 10 ML: at 05:28

## 2017-11-22 RX ADMIN — HEPARIN SODIUM 1000 UNITS: 200 INJECTION, SOLUTION INTRAVENOUS at 13:33

## 2017-11-22 RX ADMIN — FENTANYL CITRATE 50 MCG: 50 INJECTION, SOLUTION INTRAMUSCULAR; INTRAVENOUS at 13:38

## 2017-11-22 RX ADMIN — FENTANYL CITRATE 50 MCG: 50 INJECTION, SOLUTION INTRAMUSCULAR; INTRAVENOUS at 13:18

## 2017-11-22 RX ADMIN — INSULIN LISPRO 3 UNITS: 100 INJECTION, SOLUTION INTRAVENOUS; SUBCUTANEOUS at 23:20

## 2017-11-22 RX ADMIN — INSULIN LISPRO 7 UNITS: 100 INJECTION, SOLUTION INTRAVENOUS; SUBCUTANEOUS at 07:59

## 2017-11-22 RX ADMIN — ASPIRIN 81 MG: 81 TABLET, COATED ORAL at 08:00

## 2017-11-22 RX ADMIN — INSULIN GLARGINE 15 UNITS: 100 INJECTION, SOLUTION SUBCUTANEOUS at 09:08

## 2017-11-22 RX ADMIN — ENOXAPARIN SODIUM 40 MG: 40 INJECTION SUBCUTANEOUS at 16:40

## 2017-11-22 RX ADMIN — SODIUM CHLORIDE 125 ML/HR: 900 INJECTION, SOLUTION INTRAVENOUS at 11:20

## 2017-11-22 RX ADMIN — Medication 10 ML: at 14:00

## 2017-11-22 RX ADMIN — DILTIAZEM HYDROCHLORIDE 240 MG: 240 CAPSULE, COATED, EXTENDED RELEASE ORAL at 08:00

## 2017-11-22 RX ADMIN — METHYLPREDNISOLONE SODIUM SUCCINATE 40 MG: 40 INJECTION, POWDER, FOR SOLUTION INTRAMUSCULAR; INTRAVENOUS at 05:27

## 2017-11-22 RX ADMIN — GEMFIBROZIL 600 MG: 600 TABLET ORAL at 08:00

## 2017-11-22 RX ADMIN — Medication 10 ML: at 22:00

## 2017-11-22 RX ADMIN — INSULIN LISPRO 7 UNITS: 100 INJECTION, SOLUTION INTRAVENOUS; SUBCUTANEOUS at 16:40

## 2017-11-22 RX ADMIN — LIDOCAINE HYDROCHLORIDE 10 ML: 10 INJECTION, SOLUTION INFILTRATION; PERINEURAL at 13:24

## 2017-11-22 RX ADMIN — GEMFIBROZIL 600 MG: 600 TABLET ORAL at 16:40

## 2017-11-22 RX ADMIN — HEPARIN SODIUM 1000 UNITS: 200 INJECTION, SOLUTION INTRAVENOUS at 12:57

## 2017-11-22 RX ADMIN — METOPROLOL SUCCINATE 50 MG: 50 TABLET, FILM COATED, EXTENDED RELEASE ORAL at 08:00

## 2017-11-22 NOTE — PROGRESS NOTES
1355 TRANSFER - IN REPORT:    Verbal report received from Deandra(name) on Brittany Landin  being received from [facility](unit) for routine progression of care      Report consisted of patients Situation, Background, Assessment and   Recommendations(SBAR). Information from the following report(s) Procedure Summary was reviewed with the receiving nurse. Opportunity for questions and clarification was provided. Assessment completed upon patients arrival to unit and care assumed. Pt voices understanding of post procedure bedrest instructions. 1410 Pt resting in bed. No family present at this time per patient. 2:22 PM  TRANSFER - OUT REPORT:    Verbal report given to Kel(name) on Brittany Landin  being transferred to [facility](unit) for routine progression of care       Report consisted of patients Situation, Background, Assessment and   Recommendations(SBAR). Information from the following report(s) SBAR, Procedure Summary, Intake/Output, MAR and Cardiac Rhythm ST was reviewed with the receiving nurse. Lines:   Peripheral IV 11/18/17 Right Hand (Active)   Site Assessment Clean, dry, & intact 11/22/2017  1:58 PM   Phlebitis Assessment 0 11/22/2017  1:58 PM   Infiltration Assessment 0 11/22/2017  1:58 PM   Dressing Status Clean, dry, & intact 11/22/2017  1:58 PM   Dressing Type Tape;Transparent 11/22/2017  8:00 AM   Hub Color/Line Status Green 11/22/2017  8:00 AM   Action Taken Open ports on tubing capped 11/22/2017  5:00 AM   Alcohol Cap Used Yes 11/22/2017  8:00 AM        Opportunity for questions and clarification was provided.       Patient transported with:   Monitor  O2 @ 6 liters  Registered Nurse

## 2017-11-22 NOTE — ROUTINE PROCESS
Bedside and Verbal shift change report given to Bradly Disla RN (oncoming nurse) by Muriel Diaz RN/ANTONIA Arredondo (offgoing nurse). Report included the following information SBAR, Kardex, Intake/Output, MAR, Accordion, Recent Results, Cardiac Rhythm SR and Alarm Parameters .

## 2017-11-22 NOTE — CARDIO/PULMONARY
Cardiac Rehab: 11/22/2017 @ 1300: Following pt for CHF education. Pt was given Heart failure education packet yesterday. Pt with h/o dHF. Echo done 11/18/17 showed LVEF of 60%. Pt cardiologist is in [facility] and records have been request.  Pt is adamant about leaving and has been asking to go home however he remains on high flow. Cardiac and pulmonary concerned about right heart function. Pt has been scheduled for right heart cath and is presently in cath lab. Cardiac rehab to follow.

## 2017-11-22 NOTE — ROUTINE PROCESS
Bedside and Verbal shift change report given to Josepha Nageotte, RN/Maria Elena RN (oncoming nurse) by Lacey Torrez RN (offgoing nurse). Report included the following information SBAR, Kardex, ED Summary, Intake/Output, MAR, Accordion, Recent Results, Cardiac Rhythm SR and Alarm Parameters .

## 2017-11-22 NOTE — PROGRESS NOTES
Name: [facility]: [address]   : 1980 Admit Date: 2017   Phone:   Room: 324/01   PCP: Hudson Augustine MD  MRN: [id number]   Date: 2017  Code: Full Code        Chart and notes reviewed. Data reviewed. I review the patient's current medications in the medical record at each encounter. I have evaluated and examined the patient. Overnight events  Afebrile  Sats 98% on BiPAP  K 6.1  Creat 2.01- likely elevated due to contrast   I/O: - 1550 ml  BLE VD negative for DVT  ECHO: EF 55-60%; mild TR  Only pulling 750-800 on IS  Bedside jean pierre with no obstruction, but FEV1 28 and FVC 27 suggesting restriction    ROS: Sitting up in bed with Hi Nithin on. States he feels fine. Has some sputum that gets hung up in his throat at times. Has a sporadic cough, but not productive. Again, denies feeling SOB despite high O2 requirements. Denies fever, chills. Denies CP. Denies abd pain or LE pain. Reports compliance with PaP machine until it broke recently. Tried to come off high nithin onto [facility] and unsuccessful, desaturated.      Current Facility-Administered Medications   Medication Dose Route Frequency    methylPREDNISolone (PF) (SOLU-MEDROL) injection 40 mg  40 mg IntraVENous Q6H    enoxaparin (LOVENOX) injection 40 mg  40 mg SubCUTAneous Q24H    insulin glargine (LANTUS) injection 10 Units  10 Units SubCUTAneous QHS    sodium chloride (NS) flush 5-10 mL  5-10 mL IntraVENous Q8H    sodium chloride (NS) flush 5-10 mL  5-10 mL IntraVENous PRN    acetaminophen (TYLENOL) tablet 650 mg  650 mg Oral Q4H PRN    morphine injection 2 mg  2 mg IntraVENous Q4H PRN    naloxone (NARCAN) injection 0.4 mg  0.4 mg IntraVENous PRN    diphenhydrAMINE (BENADRYL) injection 12.5 mg  12.5 mg IntraVENous Q4H PRN    prochlorperazine (COMPAZINE) injection 5 mg  5 mg IntraVENous Q8H PRN    LORazepam (ATIVAN) tablet 1 mg  1 mg Oral BID PRN    insulin lispro (HUMALOG) injection   SubCUTAneous AC&HS    glucose chewable tablet 16 g  4 Tab Oral PRN    dextrose (D50W) injection syrg 12.5-25 g  12.5-25 g IntraVENous PRN    glucagon (GLUCAGEN) injection 1 mg  1 mg IntraMUSCular PRN    aspirin delayed-release tablet 81 mg  81 mg Oral DAILY    dilTIAZem CD (CARDIZEM CD) capsule 240 mg  240 mg Oral DAILY    gemfibrozil (LOPID) tablet 600 mg  600 mg Oral BID    metoprolol succinate (TOPROL-XL) XL tablet 50 mg  50 mg Oral DAILY         REVIEW OF SYSTEMS   12 point ROS negative except as stated in the HPI. Physical Exam:   Visit Vitals    BP (!) 143/91 (BP 1 Location: Right arm, BP Patient Position: At rest;Supine)    Pulse 96    Temp 98.5 °F (36.9 °C)    Resp 22    Ht 5' 10\" (1.778 m)    Wt 99 kg (218 lb 4.1 oz)    SpO2 98%    BMI 31.32 kg/m2       General:  Alert, cooperative, no distress, appears stated age. Head:  Normocephalic, without obvious abnormality, atraumatic. Eyes:  Conjunctivae/corneas clear. Nose: Nares normal. Septum midline. Mucosa normal.    Throat: Lips, mucosa, and tongue normal.    Neck: Supple, symmetrical, trachea midline. Lungs:   Diminished, but clear to auscultation bilaterally. No wheeze or rales appreciated. Chest wall:  No tenderness or deformity. Heart:  Mildly tachycardic with rate 90s-100s, regular rhythm, no murmur, click, rub or gallop. Abdomen:   Soft, non-tender. Bowel sounds normal. No masses,  No organomegaly. Extremities: Extremities normal, atraumatic, no cyanosis, 1+ LE edema. Pulses: 2+ and symmetric all extremities.    Skin: Skin color, texture, turgor normal. No rashes or lesions   Neurologic: Grossly nonfocal       Lab Results   Component Value Date/Time    Sodium 137 11/22/2017 02:45 AM    Potassium 6.1 11/22/2017 02:45 AM    Chloride 99 11/22/2017 02:45 AM    CO2 33 11/22/2017 02:45 AM    BUN 79 11/22/2017 02:45 AM    Creatinine 2.01 11/22/2017 02:45 AM    Glucose 304 11/22/2017 02:45 AM    Calcium 9.2 11/22/2017 02:45 AM    Magnesium 2.5 11/22/2017 02:45 AM    Phosphorus 4.9 11/22/2017 02:45 AM       Lab Results   Component Value Date/Time    WBC 7.8 11/20/2017 01:41 AM    HGB 13.4 11/20/2017 01:41 AM    PLATELET 923 64/37/2720 01:41 AM    MCV 86.7 11/20/2017 01:41 AM       Lab Results   Component Value Date/Time    AST (SGOT) 10 11/19/2017 02:50 AM    Alk. phosphatase 73 11/19/2017 02:50 AM    Protein, total 7.6 11/19/2017 02:50 AM    Albumin 2.4 11/19/2017 02:50 AM    Globulin 5.2 11/19/2017 02:50 AM       No results found for: IRON, FE, TIBC, IBCT, PSAT, FERR    No results found for: SR, CRP, YASMIN, ANAIGG, RA, RPR, RPRT, VDRLT, VDRLS, TSH, TSHEXT, TSHEXT     No results found for: PH, PHI, PCO2, PCO2I, PO2, PO2I, HCO3, HCO3I, FIO2, FIO2I    Lab Results   Component Value Date/Time    Troponin-I, Qt. <0.04 11/19/2017 02:50 AM        No results found for: CULT    No results found for: TOXA1, RPR, HBCM, HBSAG, HAAB, HCAB1, HAAT, G6PD, CRYAC, HIVGT, HIVR, HIV1, HIV12, HIVPC, HIVRPI    No results found for: VANCT, CPK    Lab Results   Component Value Date/Time    Color YELLOW/STRAW 11/21/2017 10:50 AM    Appearance CLEAR 11/21/2017 10:50 AM    pH (UA) 5.5 11/21/2017 10:50 AM    Protein 100 11/21/2017 10:50 AM    Glucose NEGATIVE  11/21/2017 10:50 AM    Ketone NEGATIVE  11/21/2017 10:50 AM    Bilirubin NEGATIVE  11/21/2017 10:50 AM    Blood SMALL 11/21/2017 10:50 AM    Urobilinogen 0.2 11/21/2017 10:50 AM    Nitrites NEGATIVE  11/21/2017 10:50 AM    Leukocyte Esterase NEGATIVE  11/21/2017 10:50 AM    WBC 0-4 11/21/2017 10:50 AM    RBC 0-5 11/21/2017 10:50 AM    Bacteria NEGATIVE  11/21/2017 10:50 AM         Images: personally visualized    Chest CTA (11/21/17): No PE. There is atelectasis in the lower lobes. The upper lung zones are clear. No pulm edema or pleural effusions.      IMPRESSION  · Acute hypoxic respiratory failure  · Chronic hypercapnia  · CAROLIN: severe- AHI 50-60, unknown hypoxia. I called to talk to Dr. Amy Velez, not in office today.  Manager will be in around 9:30 and will try to call back and get more info then. Hx CHF  · DM  · HTN      PLAN  · Continue O2 and wean as able to keep sats > 90%; not on home O2 at baseline, unable to wean to NC. On Hi José Antonio 20lpm, 60%. · Will being process to have trilogy ordered for Pt. Team CM for Dr. Victorino Brooks not in until 930-10. Will discuss with her then. · Bumex currently held primary team; watch creat and strict I/Os  · Bubble study negative  · MIP/MEP WNL  · Rate control with metoprolol; also on Cardizem for HTN  · BiPAP nightly and with naps  · IS/OOB to chair  · Echo negative for Pulm HTN-cards to review again, only read mild TR. · Needs complete PFTs outpatient  · DVT prophylaxis: sub q heparin      Addendum: I spoke to Padmini at Dr. Sepideh Hernandez office as he was not in the office today. She is the . She accessed most recent split night study with titration- oximery with desaturation lowest 71%, and avg of 80% for 5.5hr. He is on BiPaP 15/8.      Discussed Cardiology and hospitalist.     Deborah Hobbs NP

## 2017-11-22 NOTE — PROGRESS NOTES
[address] K[address] Vaz[address]  YOB: 1980          Assessment & Plan:   1. MANOHAR  · CR BETTER from > 2 to 1.3 and back up today  · Initial MANOHAR due to Diuresis/cardio renal, second insult due to contrast  · Hold Loops  · US:no hydro  · UA: some protein ( most likely due to DM) And small blood but no RBC  · IVF today  · Serologies  · CKD due to DM,HTN  · Hyperkalemia: sps today,start IVF  2. Resp failure  · CTA neg for PE  · Large VQ Mismatch  · Hold loops as cr worse    3. HTN  · BB,Dilt  · IF work up remains illusive, would dc BB and add RAAS inhibitors  4. DM  · +VE Retinopthy  5. CHF  · Normal EF,Diastolic chf       Subjective:   CC:arf  HPI: Patient seen   MANOHAR is worse. Making urine. SOB stable, on high flow. K worsened, got SPS. He has resp acidosis.   ROS:neg for other 10 sys  Current Facility-Administered Medications   Medication Dose Route Frequency    methylPREDNISolone (PF) (SOLU-MEDROL) injection 40 mg  40 mg IntraVENous Q8H    insulin glargine (LANTUS) injection 15 Units  15 Units SubCUTAneous ACB    enoxaparin (LOVENOX) injection 40 mg  40 mg SubCUTAneous Q24H    sodium chloride (NS) flush 5-10 mL  5-10 mL IntraVENous Q8H    sodium chloride (NS) flush 5-10 mL  5-10 mL IntraVENous PRN    acetaminophen (TYLENOL) tablet 650 mg  650 mg Oral Q4H PRN    morphine injection 2 mg  2 mg IntraVENous Q4H PRN    naloxone (NARCAN) injection 0.4 mg  0.4 mg IntraVENous PRN    diphenhydrAMINE (BENADRYL) injection 12.5 mg  12.5 mg IntraVENous Q4H PRN    prochlorperazine (COMPAZINE) injection 5 mg  5 mg IntraVENous Q8H PRN    LORazepam (ATIVAN) tablet 1 mg  1 mg Oral BID PRN    insulin lispro (HUMALOG) injection   SubCUTAneous AC&HS    glucose chewable tablet 16 g  4 Tab Oral PRN    dextrose (D50W) injection syrg 12.5-25 g  12.5-25 g IntraVENous PRN    glucagon (GLUCAGEN) injection 1 mg  1 mg IntraMUSCular PRN    aspirin delayed-release tablet 81 mg  81 mg Oral DAILY    dilTIAZem CD (CARDIZEM CD) capsule 240 mg  240 mg Oral DAILY    gemfibrozil (LOPID) tablet 600 mg  600 mg Oral BID    metoprolol succinate (TOPROL-XL) XL tablet 50 mg  50 mg Oral DAILY          Objective:     Vitals:  Blood pressure (!) 143/91, pulse 96, temperature 98.5 °F (36.9 °C), resp. rate 22, height 5' 10\" (1.778 m), weight 99 kg (218 lb 4.1 oz), SpO2 98 %. Temp (24hrs), Av.1 °F (36.7 °C), Min:97.5 °F (36.4 °C), Max:98.5 °F (36.9 °C)      Intake and Output:      1901 -  0700  In: 435 [P.O.:435]  Out: 2500 [Urine:2500]    Physical Exam:                Patient is intubated:  no    Physical Examination:   GENERAL ASSESSMENT: NAD  HEENT:Nontraumatic   CHEST: CTA  HEART: S1S2  ABDOMEN: Soft,NT,  :Pedersen:   EXTREMITY: EDEMA  NEURO:Grossly non focal          ECG/rhythm:    Data Review      No results for input(s): TNIPOC in the last 72 hours. No lab exists for component: ITNL   No results for input(s): CPK, CKMB, TROIQ in the last 72 hours. Recent Labs      17   0245  17   0135  17   0141   NA  137  141  141   K  6.1*  5.0  4.4   CL  99  98  101   CO2  33*  36*  37*   BUN  79*  66*  67*   CREA  2.01*  1.56*  1.31*   GLU  304*  112*  107*   PHOS  4.9*  5.7*  4.5   MG  2.5*  2.3  2.2   CA  9.2  9.2  9.2   WBC   --    --   7.8   HGB   --    --   13.4   HCT   --    --   43.5   PLT   --    --   341      No results for input(s): INR, PTP, APTT in the last 72 hours. No lab exists for component: INREXT  Needs: urine analysis, urine sodium, protein and creatinine  Lab Results   Component Value Date/Time    Creatinine, urine 66.13 2017 10:50 AM         Discussed with:  Colleague, Nursing, pt    : Georgia Chaney MD  2017        [facility]:  [email or web address]  [place] office:  [address] [place] [address],[address]  [place], [address] [place] [address]  Phone: [phone]  Fax : [phone]    [place] office:  [address] [facility]  [place], [address]  Phone - [phone]  Fax - [phone]

## 2017-11-22 NOTE — PROGRESS NOTES
[address] [facility].      11/22/2017      RE: John Connolly      To Whom it May Concern: This is to certify that John Connolly has been hospitalized at [facility] in [place] [address] since 11/18. We do not know the anticipated date of release. Please excuse him from work      Please feel free to contact my office if you have any questions or concerns. Thank you for your assistance in this matter.     Sincerely,        Rosie Velazquez MD   [phone]

## 2017-11-22 NOTE — PROGRESS NOTES
Call made to Dr Billie Floyd to advise of lab results:  K:  6.1 and Creatinine: 2.01. No new orders received.

## 2017-11-22 NOTE — PROGRESS NOTES
Problem: Falls - Risk of  Goal: *Absence of Falls  Document Felipe Fall Risk and appropriate interventions in the flowsheet.    Outcome: Progressing Towards Goal  Fall Risk Interventions:  Mobility Interventions: Bed/chair exit alarm         Medication Interventions: Patient to call before getting OOB

## 2017-11-22 NOTE — PROCEDURES
BRIEF PROCEDURE NOTE          Date of Procedure: 11/22/2017   Preoperative Diagnosis: pHTN  Postoperative Diagnosis: pHTN    Procedure: right heart cath  Cardiologist: Nhung Appiah MD  Anesthesia: local + IV sedation, FENTANYL 100 MCG  Estimated Blood Loss: Minimal  Findings:       Right heart pressures( mm Hg)    1) RA   14  2)RV   70/5    3) PAP   71/27  4) PCWP  13      Saturations(%)  On 60% O2    IVC   83%  SVC   86%  RA   86%  RV   78%   PA   78%    CO   10.2  CI   4.7      Complications: none  Specimen removed: None

## 2017-11-22 NOTE — PROGRESS NOTES
11/22/17     CM talked with NP Pulmonology to discuss trilogy for the patient. However she reported patient not ready for discharge at this time as the patient is still on hi-flow O2. Patient lives in [place] but will be going to an Aunt's home at [address] [facility] upon discharge then will take a bus to [place]. Note: corrected [place] address is [address] [facility] [address] [place], [address] TC to [address] [facility] who has an office in [place] however patient would have to have a dx of Chronic Respiratory Failure AND COPD and he does not at this time. CM informed Francoise Arteaga Trilogy form on chart however if something should change. Patient reports he has a Bi-Pap in [place] that he got in 2016 but it is broke. He said he has to go to his sleep study doctor to get it fixed. Pulmonologist reported she talked to sleep MD and the Bi-pap was removed because he was not compliant. Will have to follow for discharge assistance.     FLORIDALMA Valderrama

## 2017-11-22 NOTE — PROGRESS NOTES
1435: Pt arrived back in room from the cath lab, right groin site inspected with cath lab RN, adeline IRBY, no signs of bruising or bleeding. VSS. 1445: Pt refusing to wear oxygen sats dropped to 77 on the monitor without oxygen. Will notify physician. 1450: Dr Denise Oglesby notified, ordered to allow pt a while to calm down, then urge the pt to place oxygen back on. Will continue to monitor. 1515: Pt place himself back on his high flow, current sat's are 93/94. Will continue to monitor. 1530: Pt agreeable to treatment again. IV fluids hooked back up. Pts right groin site adeline IRBY with no signs of bleeding.

## 2017-11-22 NOTE — DIABETES MGMT
DTC Progress Note    Recommendations/ Comments: Chart reviewed d/t elevated BG's while on steroids. Noted MD increased Lantus dose this morning. May also want to consider   1. Adding \"Carb Controlled\" to pt current diet order  2. Changing correctional  Lispro scale to \"resistant\" while on steroids    Chart reviewed on Linda Vazquez Patient is a 44 y.o. male with known Type 2 Diabetes on 54 units Lantus/d and Metformin 1000 mg BID at home. A1c:   Lab Results   Component Value Date/Time    Hemoglobin A1c 11.3 11/18/2017 02:11 PM       Recent Glucose Results: Lab Results   Component Value Date/Time     (H) 11/22/2017 02:45 AM    GLUCPOC 440 (H) 11/22/2017 11:34 AM    GLUCPOC 300 (H) 11/22/2017 07:50 AM    GLUCPOC 307 (H) 11/21/2017 08:47 PM        Lab Results   Component Value Date/Time    Creatinine 2.01 11/22/2017 02:45 AM     Estimated Creatinine Clearance: 58.8 mL/min (based on Cr of 2.01). Active Orders   Diet    DIET CARDIAC Regular        PO intake: Patient Vitals for the past 72 hrs:   % Diet Eaten   11/20/17 1058 100 %       Current hospital DM medication: Lantus 15 units; correctional Lispro-normal sensitivity     Will continue to follow as needed. Thank you  Angelina Padron RD, CDE

## 2017-11-22 NOTE — PROGRESS NOTES
Progress Note                                        Mustapha Robbins MD, [address] [facility], [place], [address] [place] [address] N[place]                                                 Phone [phone]; Fax [phone]        2017      Admit Date:           2017  Admit Diagnosis:  SOB (shortness of breath)  :          1980   MRN:          [id number]   ASSESSMENT/RECOMMENDATION:   1)Acute on chronic respiratory failure:   - unable to wean oxygen, remains on hi flow  -ECHO normal LVEF, RV dilated, mild TR   - pulmonary following  -VQ and CT chest - for PE  -plan for PFT's OP   - ECHO with bubble study yesterday no shunt     2. Chronic diastolic CHF:   - now with MANOHAR , worse   - renal following  - stopped loops   - awaiting records from [place] cardiologist       3) DM w renal complications   -[id number]  11.3   - DCT following    4. Sinus tach:   - HR       5) HTN  -cont dilt, toprol     6) CAROLIN  -cont CPAP     7) dyslipidemia  -follows in [id number]  I have discussed case with , and decided with consent of patient to proceed with right heart cath and check saturations. PATIENT was  Personally examined and chart reviewed. All the elements of history and examination were personally performed and I agree with the plan as listed above. Treatment plan was addressed with the patient. Leandro Menard MD                                 Last 3 Recorded Weights in this Encounter    17 0554 17 0519 17 0558   Weight: 222 lb 7.1 oz (100.9 kg) 217 lb 2.5 oz (98.5 kg) 218 lb 4.1 oz (99 kg)          190 -  0700  In: 26 [P.O.:435]  Out: 2500 [Urine:2500]    JALIL Miller is a 44 y.o. male admitted with dyspnea and hypoxia,  noted PAKO. He is visiting from [place] he has stated he has received treatment for CHF. CXR no CHF and BNP minimally elevated. Symptoms include: dyspnea  Cardiac risk factors: dyslipidemia, diabetes mellitus, male gender, hypertension. Oxygen sat was 87% on arrival.  He has been having a cough and rhinorrhea for 4 days with fever as well and told the ER this but states now this is not the case. Pt states he takes Lasix BID and is compliant with his medication. He has a cardioloigst in [place]. Last admssion to hospital for similar sxs was in September. He has been here for two weeks and aunt making foods high in sodium. He had a stress test one year ago but unsure what his EF is . Willow Torre reports dyspnea on exertion.       Current Facility-Administered Medications   Medication Dose Route Frequency    methylPREDNISolone (PF) (SOLU-MEDROL) injection 40 mg  40 mg IntraVENous Q8H    insulin glargine (LANTUS) injection 15 Units  15 Units SubCUTAneous ACB    enoxaparin (LOVENOX) injection 40 mg  40 mg SubCUTAneous Q24H    sodium chloride (NS) flush 5-10 mL  5-10 mL IntraVENous Q8H    sodium chloride (NS) flush 5-10 mL  5-10 mL IntraVENous PRN    acetaminophen (TYLENOL) tablet 650 mg  650 mg Oral Q4H PRN    morphine injection 2 mg  2 mg IntraVENous Q4H PRN    naloxone (NARCAN) injection 0.4 mg  0.4 mg IntraVENous PRN    diphenhydrAMINE (BENADRYL) injection 12.5 mg  12.5 mg IntraVENous Q4H PRN    prochlorperazine (COMPAZINE) injection 5 mg  5 mg IntraVENous Q8H PRN    LORazepam (ATIVAN) tablet 1 mg  1 mg Oral BID PRN    insulin lispro (HUMALOG) injection   SubCUTAneous AC&HS    glucose chewable tablet 16 g  4 Tab Oral PRN    dextrose (D50W) injection syrg 12.5-25 g  12.5-25 g IntraVENous PRN    glucagon (GLUCAGEN) injection 1 mg  1 mg IntraMUSCular PRN    aspirin delayed-release tablet 81 mg  81 mg Oral DAILY    dilTIAZem CD (CARDIZEM CD) capsule 240 mg  240 mg Oral DAILY    gemfibrozil (LOPID) tablet 600 mg  600 mg Oral BID    metoprolol succinate (TOPROL-XL) XL tablet 50 mg  50 mg Oral DAILY      OBJECTIVE               Intake/Output Summary (Last 24 hours) at 11/22/17 1045  Last data filed at 11/22/17 0445   Gross per 24 hour   Intake              240 ml   Output             1550 ml   Net            -1310 ml       Review of Systems - History obtained from the patient AS PER  HPI        PHYSICAL EXAM        Visit Vitals    BP (!) 143/91 (BP 1 Location: Right arm, BP Patient Position: At rest;Supine)    Pulse 96    Temp 98.5 °F (36.9 °C)    Resp 22    Ht 5' 10\" (1.778 m)    Wt 218 lb 4.1 oz (99 kg)    SpO2 98%    BMI 31.32 kg/m2       Gen: Well-developed, well-nourished, in no acute distress  alert and oriented x 3  HEENT:  Pink conjunctivae, Hearing grossly normal.No scleral icterus or conjunctival, moist mucous membranes  Neck: Supple,No JVD, No Carotid Bruit, Thyroid- non tender No cervical lymphadenopathy  Resp: No accessory muscle use, Clear breath sounds, No rales or rhonchi  Card: Regular Rate,Rythm,Normal S1, S2, No murmurs, rubs or gallop. No thrills. MSK: No cyanosis or clubbing, good capillary refill  Skin: No rashes or ulcers, no bruising  Neuro:  Cranial nerves are grossly intact, moving all four extremities, no focal deficit, follows commands appropriately  Psych:  Good insight, oriented to person, place and time, alert, Nml Affect  LE: + 1 LE  edema       DATA REVIEW            Laboratory and Imaging have been reviewed by me and are notable for  No results for input(s): CPK, CKMB, TROIQ in the last 72 hours.   Recent Labs      11/22/17   0245  11/21/17   0135  11/20/17   0141   NA  137  141  141   K  6.1*  5.0  4.4   CO2  33*  36*  37*   BUN  79*  66*  67*   CREA  2.01*  1.56*  1.31*   GLU  304*  112*  107*   PHOS  4.9*  5.7*  4.5   MG  2.5*  2.3  2.2   WBC   --    --   7.8   HGB   --    --   13.4   HCT   --    --   43.5   PLT   --    --   [address] [place], NP

## 2017-11-22 NOTE — PROGRESS NOTES
[facility] 79  [address]  [phone]      Medical Progress Note      NAME: Gabe Salmeron   :  1980  MRM:  [id number]    Date/Time: 2017         Subjective:     Chief Complaint:  Patient was seen and examined by me. Chart reviewed. Still on high flow O2.  desat with movements       Objective:       Vitals:       Last 24hrs VS reviewed since prior progress note.  Most recent are:    Visit Vitals    BP (!) 143/91 (BP 1 Location: Right arm, BP Patient Position: At rest;Supine)    Pulse 96    Temp 98.5 °F (36.9 °C)    Resp 22    Ht 5' 10\" (1.778 m)    Wt 99 kg (218 lb 4.1 oz)    SpO2 98%    BMI 31.32 kg/m2     SpO2 Readings from Last 6 Encounters:   17 98%    O2 Flow Rate (L/min): 20 l/min       Intake/Output Summary (Last 24 hours) at 17 0931  Last data filed at 17 0445   Gross per 24 hour   Intake              240 ml   Output             1550 ml   Net            -1310 ml        Exam:     Physical Exam:    Gen:  Disheveled, obese, mild distress  HEENT:  Pink conjunctivae, PERRL, hearing intact to voice, on 60% high flow O2  Neck:  Supple, without masses, thyroid non-tender  Resp:  No accessory muscle use, poor BS at bases, some crackles at bases  Card:  No murmurs, normal S1, S2 without thrills, trace edema  Abd:  Soft, non-tender, non-distended, normoactive bowel sounds are present  Musc:  No cyanosis or clubbing  Skin:  No rashes  Neuro:  Cranial nerves 3-12 are grossly intact, follows commands appropriately  Psych:  Good insight, oriented to person, place and time, alert    Medications Reviewed: (see below)    Lab Data Reviewed: (see below)    ______________________________________________________________________    Medications:     Current Facility-Administered Medications   Medication Dose Route Frequency    methylPREDNISolone (PF) (SOLU-MEDROL) injection 40 mg  40 mg IntraVENous Q8H    insulin glargine (LANTUS) injection 15 Units  15 Units SubCUTAneous ACB    enoxaparin (LOVENOX) injection 40 mg  40 mg SubCUTAneous Q24H    sodium chloride (NS) flush 5-10 mL  5-10 mL IntraVENous Q8H    sodium chloride (NS) flush 5-10 mL  5-10 mL IntraVENous PRN    acetaminophen (TYLENOL) tablet 650 mg  650 mg Oral Q4H PRN    morphine injection 2 mg  2 mg IntraVENous Q4H PRN    naloxone (NARCAN) injection 0.4 mg  0.4 mg IntraVENous PRN    diphenhydrAMINE (BENADRYL) injection 12.5 mg  12.5 mg IntraVENous Q4H PRN    prochlorperazine (COMPAZINE) injection 5 mg  5 mg IntraVENous Q8H PRN    LORazepam (ATIVAN) tablet 1 mg  1 mg Oral BID PRN    insulin lispro (HUMALOG) injection   SubCUTAneous AC&HS    glucose chewable tablet 16 g  4 Tab Oral PRN    dextrose (D50W) injection syrg 12.5-25 g  12.5-25 g IntraVENous PRN    glucagon (GLUCAGEN) injection 1 mg  1 mg IntraMUSCular PRN    aspirin delayed-release tablet 81 mg  81 mg Oral DAILY    dilTIAZem CD (CARDIZEM CD) capsule 240 mg  240 mg Oral DAILY    gemfibrozil (LOPID) tablet 600 mg  600 mg Oral BID    metoprolol succinate (TOPROL-XL) XL tablet 50 mg  50 mg Oral DAILY          Lab Review:     Recent Labs      11/20/17   0141   WBC  7.8   HGB  13.4   HCT  43.5   PLT  341     Recent Labs      11/22/17   0245  11/21/17   0135  11/20/17   0141   NA  137  141  141   K  6.1*  5.0  4.4   CL  99  98  101   CO2  33*  36*  37*   GLU  304*  112*  107*   BUN  79*  66*  67*   CREA  2.01*  1.56*  1.31*   CA  9.2  9.2  9.2   MG  2.5*  2.3  2.2   PHOS  4.9*  5.7*  4.5     Lab Results   Component Value Date/Time    Glucose (POC) 300 11/22/2017 07:50 AM    Glucose (POC) 307 11/21/2017 08:47 PM    Glucose (POC) 171 11/21/2017 04:48 PM    Glucose (POC) 170 11/21/2017 11:27 AM    Glucose (POC) 87 11/21/2017 07:44 AM          Assessment / Plan: Active Problems:    45 yo hx of HTN, DM, CAROLIN, presented w/ resp failure, severe hypoxia, CHF    1) Acute resp failure/severe hypoxia: not much better.   On high flow O2. Large V/Q mismatch. Unclear etiology. Most likely due to severe CAROLIN. V/Q and chest CTA neg for PE. Will cont O2, bipap prn. Pulm following, plan for home Trilogy, home O2    2) Acute CHF: diastolic, echo with EF 03-10%. Volume is stable. Will hold bumex due to worsening renal function. Cont to monitor. Cards following    3) MANOHAR: could be cardio-renal syndrome vs progression of CKD. Renal following    4) Hyperkalemia: due to MANOHAR. Will give kayexalate today. Monitor BMP    5) HTN: cont metoprolol, Dilt    6) Type 2 diabetes mellitus with renal complication: R0F 37.5%. BS worse from IV steroids. Holding metformin. Increase Lantus today, cont SSI. DM educator following    7) CAROLIN: severe. Will need home O2, Trilogy. Follows by a pulmonologist in [place]    8) Social: patient has been very disgruntled and beligerent toward MD and nursing regarding his medical issues.   He kept threatening to leave AMA and stated that he will not use O2 at home    Total time spent with patient: 45 min                   Care Plan discussed with: Patient, nursing, pulm, renal    Discussed:  Care Plan    Prophylaxis:  Lovenox    Disposition:   PT, OT, RN           ___________________________________________________    Attending Physician: Rosie Velazquez MD

## 2017-11-23 PROBLEM — I27.20 MODERATE TO SEVERE PULMONARY HYPERTENSION (HCC): Status: ACTIVE | Noted: 2017-11-23

## 2017-11-23 LAB
ANION GAP SERPL CALC-SCNC: 3 MMOL/L (ref 5–15)
BUN SERPL-MCNC: 60 MG/DL (ref 6–20)
BUN/CREAT SERPL: 40 (ref 12–20)
CALCIUM SERPL-MCNC: 8.6 MG/DL (ref 8.5–10.1)
CHLORIDE SERPL-SCNC: 103 MMOL/L (ref 97–108)
CO2 SERPL-SCNC: 37 MMOL/L (ref 21–32)
CREAT SERPL-MCNC: 1.49 MG/DL (ref 0.7–1.3)
GLUCOSE BLD STRIP.AUTO-MCNC: 128 MG/DL (ref 65–100)
GLUCOSE BLD STRIP.AUTO-MCNC: 174 MG/DL (ref 65–100)
GLUCOSE BLD STRIP.AUTO-MCNC: 178 MG/DL (ref 65–100)
GLUCOSE BLD STRIP.AUTO-MCNC: 394 MG/DL (ref 65–100)
GLUCOSE SERPL-MCNC: 280 MG/DL (ref 65–100)
MAGNESIUM SERPL-MCNC: 2.4 MG/DL (ref 1.6–2.4)
PHOSPHATE SERPL-MCNC: 3.7 MG/DL (ref 2.6–4.7)
POTASSIUM SERPL-SCNC: 4.1 MMOL/L (ref 3.5–5.1)
SERVICE CMNT-IMP: ABNORMAL
SODIUM SERPL-SCNC: 143 MMOL/L (ref 136–145)

## 2017-11-23 PROCEDURE — 94660 CPAP INITIATION&MGMT: CPT

## 2017-11-23 PROCEDURE — 83735 ASSAY OF MAGNESIUM: CPT | Performed by: INTERNAL MEDICINE

## 2017-11-23 PROCEDURE — 77010033678 HC OXYGEN DAILY

## 2017-11-23 PROCEDURE — 74011250636 HC RX REV CODE- 250/636: Performed by: INTERNAL MEDICINE

## 2017-11-23 PROCEDURE — 82962 GLUCOSE BLOOD TEST: CPT

## 2017-11-23 PROCEDURE — 65660000000 HC RM CCU STEPDOWN

## 2017-11-23 PROCEDURE — 77010033711 HC HIGH FLOW OXYGEN

## 2017-11-23 PROCEDURE — 80048 BASIC METABOLIC PNL TOTAL CA: CPT | Performed by: INTERNAL MEDICINE

## 2017-11-23 PROCEDURE — 36415 COLL VENOUS BLD VENIPUNCTURE: CPT | Performed by: INTERNAL MEDICINE

## 2017-11-23 PROCEDURE — 84100 ASSAY OF PHOSPHORUS: CPT | Performed by: INTERNAL MEDICINE

## 2017-11-23 PROCEDURE — 74011636637 HC RX REV CODE- 636/637: Performed by: INTERNAL MEDICINE

## 2017-11-23 PROCEDURE — 74011250637 HC RX REV CODE- 250/637: Performed by: INTERNAL MEDICINE

## 2017-11-23 RX ADMIN — INSULIN LISPRO 2 UNITS: 100 INJECTION, SOLUTION INTRAVENOUS; SUBCUTANEOUS at 08:49

## 2017-11-23 RX ADMIN — METOPROLOL SUCCINATE 50 MG: 50 TABLET, FILM COATED, EXTENDED RELEASE ORAL at 08:49

## 2017-11-23 RX ADMIN — INSULIN GLARGINE 15 UNITS: 100 INJECTION, SOLUTION SUBCUTANEOUS at 08:49

## 2017-11-23 RX ADMIN — ASPIRIN 81 MG: 81 TABLET, COATED ORAL at 08:48

## 2017-11-23 RX ADMIN — Medication 10 ML: at 22:00

## 2017-11-23 RX ADMIN — DILTIAZEM HYDROCHLORIDE 240 MG: 240 CAPSULE, COATED, EXTENDED RELEASE ORAL at 08:48

## 2017-11-23 RX ADMIN — SODIUM CHLORIDE 75 ML/HR: 900 INJECTION, SOLUTION INTRAVENOUS at 13:38

## 2017-11-23 RX ADMIN — GEMFIBROZIL 600 MG: 600 TABLET ORAL at 08:49

## 2017-11-23 RX ADMIN — Medication 10 ML: at 13:39

## 2017-11-23 RX ADMIN — SODIUM CHLORIDE 125 ML/HR: 900 INJECTION, SOLUTION INTRAVENOUS at 02:46

## 2017-11-23 RX ADMIN — Medication 10 ML: at 06:00

## 2017-11-23 RX ADMIN — GEMFIBROZIL 600 MG: 600 TABLET ORAL at 17:30

## 2017-11-23 RX ADMIN — INSULIN LISPRO 2 UNITS: 100 INJECTION, SOLUTION INTRAVENOUS; SUBCUTANEOUS at 17:26

## 2017-11-23 RX ADMIN — ENOXAPARIN SODIUM 40 MG: 40 INJECTION SUBCUTANEOUS at 17:30

## 2017-11-23 RX ADMIN — INSULIN LISPRO 7 UNITS: 100 INJECTION, SOLUTION INTRAVENOUS; SUBCUTANEOUS at 21:47

## 2017-11-23 NOTE — ROUTINE PROCESS
Bedside and Verbal shift change report given to Tomas Feliciano (oncoming nurse) by Melanie Alonso RN (offgoing nurse). Report included the following information SBAR, Kardex, Intake/Output, MAR, Accordion and Recent Results.

## 2017-11-23 NOTE — PROGRESS NOTES
Name: [facility]: [facility]   : 1980 Admit Date: 2017   Phone:   Room: 324/01   PCP: Duncan Yost MD  MRN: [id number]   Date: 2017  Code: Full Code        Chart and notes reviewed. Data reviewed. I review the patient's current medications in the medical record at each encounter. I have evaluated and examined the patient. Overnight events  Afebrile  Sats: 93% on 6 liters -desaturates to 89% with movement  Creat 1.49  I/O: - 1700 ml  RHC 17 RA 14, RV 70/5 PAP 71/27, PCWP 13    ROS: Feeling depressed over the fact that he will likely be on O2 for a long time, if not lifelong. Denies SOB. States his leg swelling is better.     Current Facility-Administered Medications   Medication Dose Route Frequency    insulin glargine (LANTUS) injection 15 Units  15 Units SubCUTAneous ACB    0.9% sodium chloride infusion  75 mL/hr IntraVENous CONTINUOUS    enoxaparin (LOVENOX) injection 40 mg  40 mg SubCUTAneous Q24H    sodium chloride (NS) flush 5-10 mL  5-10 mL IntraVENous Q8H    sodium chloride (NS) flush 5-10 mL  5-10 mL IntraVENous PRN    acetaminophen (TYLENOL) tablet 650 mg  650 mg Oral Q4H PRN    morphine injection 2 mg  2 mg IntraVENous Q4H PRN    naloxone (NARCAN) injection 0.4 mg  0.4 mg IntraVENous PRN    diphenhydrAMINE (BENADRYL) injection 12.5 mg  12.5 mg IntraVENous Q4H PRN    prochlorperazine (COMPAZINE) injection 5 mg  5 mg IntraVENous Q8H PRN    LORazepam (ATIVAN) tablet 1 mg  1 mg Oral BID PRN    insulin lispro (HUMALOG) injection   SubCUTAneous AC&HS    glucose chewable tablet 16 g  4 Tab Oral PRN    dextrose (D50W) injection syrg 12.5-25 g  12.5-25 g IntraVENous PRN    glucagon (GLUCAGEN) injection 1 mg  1 mg IntraMUSCular PRN    aspirin delayed-release tablet 81 mg  81 mg Oral DAILY    dilTIAZem CD (CARDIZEM CD) capsule 240 mg  240 mg Oral DAILY    gemfibrozil (LOPID) tablet 600 mg  600 mg Oral BID    metoprolol succinate (TOPROL-XL) XL tablet 50 mg  50 mg Oral DAILY         REVIEW OF SYSTEMS   12 point ROS negative except as stated in the HPI. Physical Exam:   Visit Vitals    /88 (BP 1 Location: Right arm, BP Patient Position: At rest;Supine)    Pulse 93    Temp 98.7 °F (37.1 °C)    Resp 18    Ht 5' 10\" (1.778 m)    Wt 100.4 kg (221 lb 5.5 oz)    SpO2 93%    BMI 31.76 kg/m2       General:  Alert, cooperative, no distress, appears stated age, flat affect   Head:  Normocephalic, without obvious abnormality, atraumatic. Eyes:  Conjunctivae/corneas clear. Nose: Nares normal. Septum midline. Mucosa normal.    Throat: Lips, mucosa, and tongue normal.    Neck: Supple, symmetrical, trachea midline. Lungs:   Diminished, but clear to auscultation bilaterally. No wheeze or rales appreciated. Chest wall:  No tenderness or deformity. Heart:  Regular rhythm, no murmur, click, rub or gallop. Abdomen:   Soft, non-tender. Bowel sounds normal. No masses,  No organomegaly. Extremities: Extremities normal, atraumatic, no cyanosis, trace 1+ LE edema. Pulses: 2+ and symmetric all extremities. Skin: Skin color, texture, turgor normal. No rashes or lesions   Neurologic: Grossly nonfocal       Lab Results   Component Value Date/Time    Sodium 143 11/23/2017 01:58 AM    Potassium 4.1 11/23/2017 01:58 AM    Chloride 103 11/23/2017 01:58 AM    CO2 37 11/23/2017 01:58 AM    BUN 60 11/23/2017 01:58 AM    Creatinine 1.49 11/23/2017 01:58 AM    Glucose 280 11/23/2017 01:58 AM    Calcium 8.6 11/23/2017 01:58 AM    Magnesium 2.4 11/23/2017 01:58 AM    Phosphorus 3.7 11/23/2017 01:58 AM       Lab Results   Component Value Date/Time    WBC 7.8 11/20/2017 01:41 AM    HGB 13.4 11/20/2017 01:41 AM    PLATELET 139 91/31/5447 01:41 AM    MCV 86.7 11/20/2017 01:41 AM       Lab Results   Component Value Date/Time    AST (SGOT) 10 11/19/2017 02:50 AM    Alk.  phosphatase 73 11/19/2017 02:50 AM    Protein, total 7.6 11/19/2017 02:50 AM    Albumin 2.4 11/19/2017 02:50 AM    Globulin 5.2 11/19/2017 02:50 AM       No results found for: IRON, FE, TIBC, IBCT, PSAT, FERR    No results found for: SR, CRP, YASMIN, ANAIGG, RA, RPR, RPRT, VDRLT, VDRLS, TSH, TSHEXT, TSHEXT     No results found for: PH, PHI, PCO2, PCO2I, PO2, PO2I, HCO3, HCO3I, FIO2, FIO2I    Lab Results   Component Value Date/Time    Troponin-I, Qt. <0.04 11/19/2017 02:50 AM        No results found for: CULT    Lab Results   Component Value Date/Time    Hepatitis B surface Ag <0.10 11/21/2017 11:07 AM       No results found for: VANCT, CPK    Lab Results   Component Value Date/Time    Color YELLOW/STRAW 11/21/2017 10:50 AM    Appearance CLEAR 11/21/2017 10:50 AM    pH (UA) 5.5 11/21/2017 10:50 AM    Protein 100 11/21/2017 10:50 AM    Glucose NEGATIVE  11/21/2017 10:50 AM    Ketone NEGATIVE  11/21/2017 10:50 AM    Bilirubin NEGATIVE  11/21/2017 10:50 AM    Blood SMALL 11/21/2017 10:50 AM    Urobilinogen 0.2 11/21/2017 10:50 AM    Nitrites NEGATIVE  11/21/2017 10:50 AM    Leukocyte Esterase NEGATIVE  11/21/2017 10:50 AM    WBC 0-4 11/21/2017 10:50 AM    RBC 0-5 11/21/2017 10:50 AM    Bacteria NEGATIVE  11/21/2017 10:50 AM         Images: personally visualized    Chest CTA (11/21/17): No PE. There is atelectasis in the lower lobes. The upper lung zones are clear. No pulm edema or pleural effusions.      IMPRESSION  · Acute on chronic respiratory failure  · Hypoxia  · PulmonaryHTN due to uncontrolled CAROLIN  · CAROLIN: Sleep study 2016 severe- AHI 50-60, desaturating to the 70s for extended amounts of time. · DM  · HTN      PLAN  · Continue O2 and wean as able to keep sats > 88% at baseline. Pt may have some VQ missmatch due to atx and would keep weaning if sats remain around 88-95% to get an idea of pts O2 requirement  · Pt will need NIPPV as outpt for his chronic CO2 retention and CAROLIN. Previous bipap settings 15/8.  His pulmonarty HTN mainly due to uncontrolled sleep apnea with some component of left sided heart failure. Long term may improve some if well managed.    · Self diuresing now  · Rate control with metoprolol; also on Cardizem for HTN  · BiPAP nightly and with naps  · IS/OOB to chair  · Needs complete PFTs outpatient  · DVT prophylaxis: sub q heparin    Discussed w hospitalist.     Kyra Rodriguez MD

## 2017-11-23 NOTE — ROUTINE PROCESS
Bedside and Verbal shift change report given to Erickson Galloway (oncoming nurse) by Taylor Michelle RN (offgoing nurse). Report included the following information SBAR, Kardex, Intake/Output, MAR, Accordion and Recent Results. Bedside and Verbal shift change report given to [place], RN (oncoming nurse) by Britany Jolly RN (offgoing nurse). Report included the following information SBAR, Kardex, Intake/Output, MAR, Accordion and Recent Results.

## 2017-11-23 NOTE — PROGRESS NOTES
[facility] 79  [address] [facility], [address] [place] [address]  [phone]      Medical Progress Note      NAME: Rich Delgado   :  1980  MRM:  [id number]    Date/Time: 2017         Subjective:     Chief Complaint:  Patient was seen and examined by me. Chart reviewed. Less angry this AM.  S/p right heart cath. Still on high flow       Objective:       Vitals:       Last 24hrs VS reviewed since prior progress note.  Most recent are:    Visit Vitals    /86 (BP 1 Location: Left arm, BP Patient Position: At rest)    Pulse 89    Temp 98.2 °F (36.8 °C)    Resp 17    Ht 5' 10\" (1.778 m)    Wt 100.4 kg (221 lb 5.5 oz)    SpO2 96%    BMI 31.76 kg/m2     SpO2 Readings from Last 6 Encounters:   17 96%    O2 Flow Rate (L/min): 6 l/min       Intake/Output Summary (Last 24 hours) at 17 1014  Last data filed at 17 0548   Gross per 24 hour   Intake              960 ml   Output             2900 ml   Net            -1940 ml        Exam:     Physical Exam:    Gen:  Disheveled, obese, NAD  HEENT:  Pink conjunctivae, PERRL, hearing intact to voice, on 60% high flow O2  Neck:  Supple, without masses, thyroid non-tender  Resp:  No accessory muscle use, poor BS at bases  Card:  No murmurs, normal S1, S2 without thrills, trace edema  Abd:  Soft, non-tender, non-distended, normoactive bowel sounds are present  Musc:  No cyanosis or clubbing  Skin:  No rashes  Neuro:  Cranial nerves 3-12 are grossly intact, follows commands appropriately  Psych:  Good insight, oriented to person, place and time, alert    Medications Reviewed: (see below)    Lab Data Reviewed: (see below)    ______________________________________________________________________    Medications:     Current Facility-Administered Medications   Medication Dose Route Frequency    insulin glargine (LANTUS) injection 15 Units  15 Units SubCUTAneous ACB    0.9% sodium chloride infusion  75 mL/hr IntraVENous CONTINUOUS    enoxaparin (LOVENOX) injection 40 mg  40 mg SubCUTAneous Q24H    sodium chloride (NS) flush 5-10 mL  5-10 mL IntraVENous Q8H    sodium chloride (NS) flush 5-10 mL  5-10 mL IntraVENous PRN    acetaminophen (TYLENOL) tablet 650 mg  650 mg Oral Q4H PRN    morphine injection 2 mg  2 mg IntraVENous Q4H PRN    naloxone (NARCAN) injection 0.4 mg  0.4 mg IntraVENous PRN    diphenhydrAMINE (BENADRYL) injection 12.5 mg  12.5 mg IntraVENous Q4H PRN    prochlorperazine (COMPAZINE) injection 5 mg  5 mg IntraVENous Q8H PRN    LORazepam (ATIVAN) tablet 1 mg  1 mg Oral BID PRN    insulin lispro (HUMALOG) injection   SubCUTAneous AC&HS    glucose chewable tablet 16 g  4 Tab Oral PRN    dextrose (D50W) injection syrg 12.5-25 g  12.5-25 g IntraVENous PRN    glucagon (GLUCAGEN) injection 1 mg  1 mg IntraMUSCular PRN    aspirin delayed-release tablet 81 mg  81 mg Oral DAILY    dilTIAZem CD (CARDIZEM CD) capsule 240 mg  240 mg Oral DAILY    gemfibrozil (LOPID) tablet 600 mg  600 mg Oral BID    metoprolol succinate (TOPROL-XL) XL tablet 50 mg  50 mg Oral DAILY          Lab Review:     No results for input(s): WBC, HGB, HCT, PLT, HGBEXT, HCTEXT, PLTEXT, HGBEXT, HCTEXT, PLTEXT in the last 72 hours. Recent Labs      11/23/17   0158  11/22/17   1215  11/22/17   0245  11/21/17   0135   NA  143  136  137  141   K  4.1  5.0  6.1*  5.0   CL  103  95*  99  98   CO2  37*  33*  33*  36*   GLU  280*  408*  304*  112*   BUN  60*  74*  79*  66*   CREA  1.49*  2.00*  2.01*  1.56*   CA  8.6  9.4  9.2  9.2   MG  2.4   --   2.5*  2.3   PHOS  3.7   --   4.9*  5.7*     Lab Results   Component Value Date/Time    Glucose (POC) 174 11/23/2017 07:24 AM    Glucose (POC) 291 11/22/2017 09:17 PM    Glucose (POC) 358 11/22/2017 04:20 PM    Glucose (POC) 343 11/22/2017 01:57 PM    Glucose (POC) 440 11/22/2017 11:34 AM          Assessment / Plan:      Active Problems:    43 yo hx of HTN, DM, CAROLIN, presented w/ resp failure, severe hypoxia, CHF, likely from severe CAROLIN and pulm HTN    1) Acute resp failure/severe hypoxia: Likely due to severe CAROLIN and pulm HTN. Hypoxia not much better. Still on high flow O2. Large V/Q mismatch. Chest CTA neg for PE. Right heart cath showed severe pulm HTN. Will cont high flow O2 (weaning), bipap. Pulm following, plan for home Trilogy, home O2    2) Acute CHF: diastolic, echo with EF 44-17%. Volume is stable. Holding diuretics. Cards following    3) MANOHAR: could be cardio-renal syndrome/ATN vs progression of CKD. Slowly improving. Renal following    4) Hyperkalemia: due to MANOHAR. S/p kayexalate x1. Will monitor BMP    5) HTN: cont metoprolol, Dilt    6) Type 2 diabetes mellitus with renal complication: V7A 57.0%. BS worse from IV steroids (steroids discontinued 11/22). Holding metformin. Cont Lantus, cont SSI. DM educator following    7) CAROLIN: severe. Will need home O2, Trilogy. Follows by a pulmonologist in [place]    8) Social: patient has been very disgruntled and beligerent toward MD and nursing regarding his medical issues. He kept threatening to leave AMA and stated that he will not use O2 at home. He's also concerned about not being able to work.   Will continue to listen and discuss patient's concerns    Total time spent with patient: 40 min                   Care Plan discussed with: Patient, nursing, pulm, renal    Discussed:  Care Plan    Prophylaxis:  Lovenox    Disposition:   PT, OT, RN           ___________________________________________________    Attending Physician: Asaf Boo MD

## 2017-11-23 NOTE — PROCEDURES
[facility]   [address] [facility], [address] [facility]   [address] [facility]       Name:  Maira Hugo   MR#:  [id number]   :  1980   Account #:  [de-identified]    Date of Procedure:  2017   Date of Adm:  2017       PROCEDURE: Spirometry. CLINICAL DIAGNOSIS/INDICATIONS:      INTERPRETATION:  FEV1/FVC ratio 86, with an FVC of 27% and   FEV1 of 28%. All in all, patient showing severe restrictive disease, but   no signs of obstructive disease.         Amada Vidal MD CB / Balbir Cui   D:  2017   17:26   T:  2017   09:48   Job #:  [id number]

## 2017-11-23 NOTE — PROGRESS NOTES
..                              9 Kylah Vazquez  YOB: 1980          Assessment & Plan:   1. MANOHAR  - Cr better this am  - good UO (neg fluid balance)  - CKD 2nd to DM/HTN  2. Resp Failure  3. HTN  4. CHF         Subjective:   CC: MANOHAR/CKD3  HPI: Pt reports feeling better. No complaints. ROS: Less sob. No cp. No f/c/ns. No d. Current Facility-Administered Medications   Medication Dose Route Frequency    insulin glargine (LANTUS) injection 15 Units  15 Units SubCUTAneous ACB    0.9% sodium chloride infusion  125 mL/hr IntraVENous CONTINUOUS    enoxaparin (LOVENOX) injection 40 mg  40 mg SubCUTAneous Q24H    sodium chloride (NS) flush 5-10 mL  5-10 mL IntraVENous Q8H    sodium chloride (NS) flush 5-10 mL  5-10 mL IntraVENous PRN    acetaminophen (TYLENOL) tablet 650 mg  650 mg Oral Q4H PRN    morphine injection 2 mg  2 mg IntraVENous Q4H PRN    naloxone (NARCAN) injection 0.4 mg  0.4 mg IntraVENous PRN    diphenhydrAMINE (BENADRYL) injection 12.5 mg  12.5 mg IntraVENous Q4H PRN    prochlorperazine (COMPAZINE) injection 5 mg  5 mg IntraVENous Q8H PRN    LORazepam (ATIVAN) tablet 1 mg  1 mg Oral BID PRN    insulin lispro (HUMALOG) injection   SubCUTAneous AC&HS    glucose chewable tablet 16 g  4 Tab Oral PRN    dextrose (D50W) injection syrg 12.5-25 g  12.5-25 g IntraVENous PRN    glucagon (GLUCAGEN) injection 1 mg  1 mg IntraMUSCular PRN    aspirin delayed-release tablet 81 mg  81 mg Oral DAILY    dilTIAZem CD (CARDIZEM CD) capsule 240 mg  240 mg Oral DAILY    gemfibrozil (LOPID) tablet 600 mg  600 mg Oral BID    metoprolol succinate (TOPROL-XL) XL tablet 50 mg  50 mg Oral DAILY          Objective:     Vitals:  Blood pressure 133/82, pulse 89, temperature 98.2 °F (36.8 °C), resp. rate 11, height 5' 10\" (1.778 m), weight 100.4 kg (221 lb 5.5 oz), SpO2 97 %.   Temp (24hrs), Av.1 °F (36.7 °C), Min:97.5 °F (36.4 °C), Max:98.6 °F (37 °C)      Intake and Output:     11/21 1901 - 11/23 0700  In: 1440 [P.O.:1440]  Out: 9845 [Urine:4050]    Physical Exam:                Patient is intubated:  NO    Physical Examination:   GENERAL ASSESSMENT: NAD  HEENT:Nontraumatic   CHEST: CTA  HEART: S1S2  ABDOMEN: Soft,NT,  :Pedersen:   EXTREMITY: EDEMA  NEURO:Grossly non focal          ECG/rhythm:    Data Review      No results for input(s): TNIPOC in the last 72 hours. No lab exists for component: ITNL   No results for input(s): CPK, CKMB, TROIQ in the last 72 hours. Recent Labs      11/23/17   0158  11/22/17   1215  11/22/17   0245  11/21/17   0135   NA  143  136  137  141   K  4.1  5.0  6.1*  5.0   CL  103  95*  99  98   CO2  37*  33*  33*  36*   BUN  60*  74*  79*  66*   CREA  1.49*  2.00*  2.01*  1.56*   GLU  280*  408*  304*  112*   PHOS  3.7   --   4.9*  5.7*   MG  2.4   --   2.5*  2.3   CA  8.6  9.4  9.2  9.2      No results for input(s): INR, PTP, APTT in the last 72 hours. No lab exists for component: INREXT  Needs: urine analysis, urine sodium, protein and creatinine  Lab Results   Component Value Date/Time    Creatinine, urine 66.13 11/21/2017 10:50 AM         Discussed with:  Pt and nursing     : Chelsey Ingram MD  11/23/2017        [facility]:  [email or web address]. [employer]  Jovita Joanie office:  [address] [place] [address] [address], [address] [facility] [address]  [place], [address] [facility]  Phone: [phone]  Fax :     [phone]    [facility] office:  [address] [facility], [address]  Phone - [phone]  Fax - [phone]

## 2017-11-23 NOTE — PROGRESS NOTES
Bedside and Verbal shift change report given to Ebony Liu RN (oncoming nurse) by Broderick Wright RN (offgoing nurse). Report included the following information SBAR, Kardex, Intake/Output and Cardiac Rhythm NSR/Sinus Tach. 1030 - Patient stated \"I can't wear an oxygen tank. \" Educated patient on moving from high flow to 6 L nasal canula. While attempting to place patient on the nasal canula, patient stated \"I'm not wearing that. \" Educated patient on the importance of weaning away from the high flow to the nasal canula. Patient continued to refuse treatment. Continued to encouraged patient. Patient finally agreed to use the nasal canula. O2 sats at 92% on 6 liters. Will monitor. 1245 - Encouraged patient to use incentive spirometer every hour. Patient acknowledge understanding. 1420 - O2 sats at 94% on 5 L. Weaned O2 down to 4L. O2 sats at 93%. Will monitor. Bedside and Verbal shift change report given to Nasir Shaw (oncoming nurse) by Ebony Liu RN (offgoing nurse). Report included the following information SBAR, Kardex and MAR.

## 2017-11-24 LAB
ANION GAP SERPL CALC-SCNC: 5 MMOL/L (ref 5–15)
BUN SERPL-MCNC: 39 MG/DL (ref 6–20)
BUN/CREAT SERPL: 35 (ref 12–20)
CALCIUM SERPL-MCNC: 8.6 MG/DL (ref 8.5–10.1)
CHLORIDE SERPL-SCNC: 108 MMOL/L (ref 97–108)
CO2 SERPL-SCNC: 33 MMOL/L (ref 21–32)
CREAT SERPL-MCNC: 1.1 MG/DL (ref 0.7–1.3)
ERYTHROCYTE [DISTWIDTH] IN BLOOD BY AUTOMATED COUNT: 13.6 % (ref 11.5–14.5)
GLUCOSE BLD STRIP.AUTO-MCNC: 115 MG/DL (ref 65–100)
GLUCOSE BLD STRIP.AUTO-MCNC: 142 MG/DL (ref 65–100)
GLUCOSE BLD STRIP.AUTO-MCNC: 186 MG/DL (ref 65–100)
GLUCOSE BLD STRIP.AUTO-MCNC: 210 MG/DL (ref 65–100)
GLUCOSE SERPL-MCNC: 210 MG/DL (ref 65–100)
HCT VFR BLD AUTO: 39.8 % (ref 36.6–50.3)
HGB BLD-MCNC: 12.5 G/DL (ref 12.1–17)
MAGNESIUM SERPL-MCNC: 2.1 MG/DL (ref 1.6–2.4)
MCH RBC QN AUTO: 27.1 PG (ref 26–34)
MCHC RBC AUTO-ENTMCNC: 31.4 G/DL (ref 30–36.5)
MCV RBC AUTO: 86.1 FL (ref 80–99)
PHOSPHATE SERPL-MCNC: 3.6 MG/DL (ref 2.6–4.7)
PLATELET # BLD AUTO: 403 K/UL (ref 150–400)
POTASSIUM SERPL-SCNC: 4.3 MMOL/L (ref 3.5–5.1)
RBC # BLD AUTO: 4.62 M/UL (ref 4.1–5.7)
SERVICE CMNT-IMP: ABNORMAL
SODIUM SERPL-SCNC: 146 MMOL/L (ref 136–145)
WBC # BLD AUTO: 7.3 K/UL (ref 4.1–11.1)

## 2017-11-24 PROCEDURE — 94660 CPAP INITIATION&MGMT: CPT

## 2017-11-24 PROCEDURE — 82962 GLUCOSE BLOOD TEST: CPT

## 2017-11-24 PROCEDURE — 83735 ASSAY OF MAGNESIUM: CPT | Performed by: INTERNAL MEDICINE

## 2017-11-24 PROCEDURE — 85027 COMPLETE CBC AUTOMATED: CPT | Performed by: INTERNAL MEDICINE

## 2017-11-24 PROCEDURE — 74011636637 HC RX REV CODE- 636/637: Performed by: INTERNAL MEDICINE

## 2017-11-24 PROCEDURE — 74011250636 HC RX REV CODE- 250/636: Performed by: INTERNAL MEDICINE

## 2017-11-24 PROCEDURE — 65660000000 HC RM CCU STEPDOWN

## 2017-11-24 PROCEDURE — 84100 ASSAY OF PHOSPHORUS: CPT | Performed by: INTERNAL MEDICINE

## 2017-11-24 PROCEDURE — 36415 COLL VENOUS BLD VENIPUNCTURE: CPT | Performed by: INTERNAL MEDICINE

## 2017-11-24 PROCEDURE — 80048 BASIC METABOLIC PNL TOTAL CA: CPT | Performed by: INTERNAL MEDICINE

## 2017-11-24 PROCEDURE — 74011250637 HC RX REV CODE- 250/637: Performed by: INTERNAL MEDICINE

## 2017-11-24 PROCEDURE — 77010033678 HC OXYGEN DAILY

## 2017-11-24 RX ADMIN — ENOXAPARIN SODIUM 40 MG: 40 INJECTION SUBCUTANEOUS at 17:33

## 2017-11-24 RX ADMIN — Medication 10 ML: at 06:00

## 2017-11-24 RX ADMIN — INSULIN GLARGINE 15 UNITS: 100 INJECTION, SOLUTION SUBCUTANEOUS at 08:45

## 2017-11-24 RX ADMIN — Medication 10 ML: at 17:34

## 2017-11-24 RX ADMIN — GEMFIBROZIL 600 MG: 600 TABLET ORAL at 17:33

## 2017-11-24 RX ADMIN — GEMFIBROZIL 600 MG: 600 TABLET ORAL at 10:53

## 2017-11-24 RX ADMIN — ACETAMINOPHEN 650 MG: 325 TABLET ORAL at 08:45

## 2017-11-24 RX ADMIN — INSULIN LISPRO 2 UNITS: 100 INJECTION, SOLUTION INTRAVENOUS; SUBCUTANEOUS at 17:33

## 2017-11-24 RX ADMIN — ASPIRIN 81 MG: 81 TABLET, COATED ORAL at 09:00

## 2017-11-24 RX ADMIN — METOPROLOL SUCCINATE 50 MG: 50 TABLET, FILM COATED, EXTENDED RELEASE ORAL at 08:45

## 2017-11-24 RX ADMIN — INSULIN LISPRO 3 UNITS: 100 INJECTION, SOLUTION INTRAVENOUS; SUBCUTANEOUS at 12:13

## 2017-11-24 RX ADMIN — Medication 10 ML: at 22:20

## 2017-11-24 RX ADMIN — INSULIN LISPRO 2 UNITS: 100 INJECTION, SOLUTION INTRAVENOUS; SUBCUTANEOUS at 08:46

## 2017-11-24 RX ADMIN — DILTIAZEM HYDROCHLORIDE 240 MG: 240 CAPSULE, COATED, EXTENDED RELEASE ORAL at 08:45

## 2017-11-24 NOTE — PROGRESS NOTES
Name: [facility]   : 1980 Admit Date: 2017   Phone:   Room: 324/01   PCP: Jannie Kay MD  MRN: [id number]   Date: 2017  Code: Full Code        Chart and notes reviewed. Data reviewed. I review the patient's current medications in the medical record at each encounter. I have evaluated and examined the patient. Overnight events  Down to 4 liters O2 and maintaining in low to mid 90s. Denies SOB. Asking when he can go home. Creat 1.10  I/O: 1718/6450 - 651      Current Facility-Administered Medications   Medication Dose Route Frequency    insulin glargine (LANTUS) injection 15 Units  15 Units SubCUTAneous ACB    0.9% sodium chloride infusion  75 mL/hr IntraVENous CONTINUOUS    enoxaparin (LOVENOX) injection 40 mg  40 mg SubCUTAneous Q24H    sodium chloride (NS) flush 5-10 mL  5-10 mL IntraVENous Q8H    sodium chloride (NS) flush 5-10 mL  5-10 mL IntraVENous PRN    acetaminophen (TYLENOL) tablet 650 mg  650 mg Oral Q4H PRN    morphine injection 2 mg  2 mg IntraVENous Q4H PRN    naloxone (NARCAN) injection 0.4 mg  0.4 mg IntraVENous PRN    diphenhydrAMINE (BENADRYL) injection 12.5 mg  12.5 mg IntraVENous Q4H PRN    prochlorperazine (COMPAZINE) injection 5 mg  5 mg IntraVENous Q8H PRN    LORazepam (ATIVAN) tablet 1 mg  1 mg Oral BID PRN    insulin lispro (HUMALOG) injection   SubCUTAneous AC&HS    glucose chewable tablet 16 g  4 Tab Oral PRN    dextrose (D50W) injection syrg 12.5-25 g  12.5-25 g IntraVENous PRN    glucagon (GLUCAGEN) injection 1 mg  1 mg IntraMUSCular PRN    aspirin delayed-release tablet 81 mg  81 mg Oral DAILY    dilTIAZem CD (CARDIZEM CD) capsule 240 mg  240 mg Oral DAILY    gemfibrozil (LOPID) tablet 600 mg  600 mg Oral BID    metoprolol succinate (TOPROL-XL) XL tablet 50 mg  50 mg Oral DAILY         REVIEW OF SYSTEMS   12 point ROS negative except as stated in the HPI.       Physical Exam:   Visit Vitals    /82  Pulse 98    Temp 97.9 °F (36.6 °C)    Resp 23    Ht 5' 10\" (1.778 m)    Wt 100 kg (220 lb 7.4 oz)    SpO2 93%    BMI 31.63 kg/m2       General:  Alert, cooperative, no distress, appears stated age, flat affect   Head:  Normocephalic, without obvious abnormality, atraumatic. Eyes:  Conjunctivae/corneas clear. Nose: Nares normal. Septum midline. Mucosa normal.    Throat: Lips, mucosa, and tongue normal.    Neck: Supple, symmetrical, trachea midline. Lungs:   Diminished, but clear to auscultation bilaterally. No wheeze or rales appreciated. Chest wall:  No tenderness or deformity. Heart:  Regular rhythm, no murmur, click, rub or gallop. Abdomen:   Soft, non-tender. Bowel sounds normal. No masses,  No organomegaly. Extremities: Extremities normal, atraumatic, no cyanosis, trace LE edema. Pulses: 2+ and symmetric all extremities. Skin: Skin color, texture, turgor normal. No rashes or lesions   Neurologic: Grossly nonfocal       Lab Results   Component Value Date/Time    Sodium 146 11/24/2017 04:49 AM    Potassium 4.3 11/24/2017 04:49 AM    Chloride 108 11/24/2017 04:49 AM    CO2 33 11/24/2017 04:49 AM    BUN 39 11/24/2017 04:49 AM    Creatinine 1.10 11/24/2017 04:49 AM    Glucose 210 11/24/2017 04:49 AM    Calcium 8.6 11/24/2017 04:49 AM    Magnesium 2.1 11/24/2017 04:49 AM    Phosphorus 3.6 11/24/2017 04:49 AM       Lab Results   Component Value Date/Time    WBC 7.3 11/24/2017 04:49 AM    HGB 12.5 11/24/2017 04:49 AM    PLATELET 175 73/60/5918 04:49 AM    MCV 86.1 11/24/2017 04:49 AM       Lab Results   Component Value Date/Time    AST (SGOT) 10 11/19/2017 02:50 AM    Alk.  phosphatase 73 11/19/2017 02:50 AM    Protein, total 7.6 11/19/2017 02:50 AM    Albumin 2.4 11/19/2017 02:50 AM    Globulin 5.2 11/19/2017 02:50 AM       No results found for: IRON, FE, TIBC, IBCT, PSAT, FERR    No results found for: SR, CRP, YASMIN, ANAIGG, RA, RPR, RPRT, VDRLT, VDRLS, TSH, TSHEXT, TSHEXT     No results found for: PH, PHI, PCO2, PCO2I, PO2, PO2I, HCO3, HCO3I, FIO2, FIO2I    Lab Results   Component Value Date/Time    Troponin-I, Qt. <0.04 11/19/2017 02:50 AM        No results found for: CULT    Lab Results   Component Value Date/Time    Hepatitis B surface Ag <0.10 11/21/2017 11:07 AM       No results found for: VANCT, CPK    Lab Results   Component Value Date/Time    Color YELLOW/STRAW 11/21/2017 10:50 AM    Appearance CLEAR 11/21/2017 10:50 AM    pH (UA) 5.5 11/21/2017 10:50 AM    Protein 100 11/21/2017 10:50 AM    Glucose NEGATIVE  11/21/2017 10:50 AM    Ketone NEGATIVE  11/21/2017 10:50 AM    Bilirubin NEGATIVE  11/21/2017 10:50 AM    Blood SMALL 11/21/2017 10:50 AM    Urobilinogen 0.2 11/21/2017 10:50 AM    Nitrites NEGATIVE  11/21/2017 10:50 AM    Leukocyte Esterase NEGATIVE  11/21/2017 10:50 AM    WBC 0-4 11/21/2017 10:50 AM    RBC 0-5 11/21/2017 10:50 AM    Bacteria NEGATIVE  11/21/2017 10:50 AM         Images: personally visualized    Chest CTA (11/21/17): No PE. There is atelectasis in the lower lobes. The upper lung zones are clear. No pulm edema or pleural effusions.      IMPRESSION  · Chronic respiratory failure  · Chronic hypercapnea  · Hypoxia  · PulmonaryHTN due to uncontrolled CAROLIN  · CAROLIN: Sleep study 2016 severe- AHI 50-60, desaturating to the 70s for extended amounts of time. · DM  · HTN      PLAN  · Continue O2 and wean as able to keep sats > 88% at baseline. Pt may have some VQ missmatch due to atx and would keep weaning if sats remain around 88-95% to get an idea of pts O2 requirement  · Pt will need NIPPV as outpt for his chronic CO2 retention and CAROLIN. Previous bipap settings 15/8. His pulmonarty HTN mainly due to uncontrolled sleep apnea with some component of left sided heart failure. Long term may improve some if well managed. · Self diuresing now. Would start PO diuretics tomorrow.   · Rate control with metoprolol; also on Cardizem for HTN  · BiPAP nightly and with naps  · IS/OOB to chair  · Needs complete PFTs outpatient  · DVT prophylaxis: sub q heparin    A noninvasive ventilator is being ordered for the pt because bipap and cpap will not provide the necessary ventilatory support or settings needed to treat this patient who has chronic hypercapnic respiratory failure.     Discussed w hospitalist and case management  Will see Josiah Roberts MD

## 2017-11-24 NOTE — PROGRESS NOTES
Spiritual Care Assessment/Progress Notes    Doriansupa Lehman [id number]  [id number]    1980  44 y.o.  male    Patient Telephone Number: There is no home phone number on file. Nondenominational Affiliation: [place]hel[place] Islander   Language: English   No emergency contact information on file. Patient Active Problem List    Diagnosis Date Noted    Moderate to severe pulmonary hypertension 11/23/2017    SOB (shortness of breath) 11/18/2017    Type 2 diabetes mellitus with renal complication ([facility] [address] [facility] 67/84/4295    HTN (hypertension), benign 11/18/2017    Hyperlipidemia 11/18/2017    Bilateral lower extremity edema 11/18/2017    Hypoxia 11/18/2017    Sleep apnea 11/18/2017    CHF with unknown LVEF ([facility] [address] 11/18/2017        Date: 11/24/2017       Level of Nondenominational/Spiritual Activity:  []         Involved in kelly tradition/spiritual practice    []         Not involved in kelly tradition/spiritual practice  [x]         Spiritually oriented    []         Claims no spiritual orientation    []         seeking spiritual identity  []         Feels alienated from Mormon practice/tradition  []         Feels angry about Mormon practice/tradition  []         Spirituality/Mormon tradition  a resource for coping at this time.   []         Not able to assess due to medical condition    Services Provided Today:  []         crisis intervention    []         reading Scriptures  [x]         spiritual assessment    []         prayer  [x]         empathic listening/emotional support  []         rites and rituals (cite in comments)  []         life review     []         Mormon support  []         theological development   []         advocacy  []         ethical dialog     []         blessing  []         bereavement support    []         support to family  []         anticipatory grief support   []         help with AMD  []         spiritual guidance    []         meditation      Spiritual Care Needs  []         Emotional Support  [] Spiritual/Sabianist Care  []         Loss/Adjustment  []         Advocacy/Referral                /Ethics  [x]         No needs expressed at               this time  []         Other: (note in               comments)  Spiritual Care Plan  []         Follow up visits with               pt/family  []         Provide materials  []         Schedule sacraments  []         Contact Community               Clergy  [x]         Follow up as needed  []         Other: (note in               comments)     Comments: Initial spiritual assessment in [facility]. Mr. Delma Banuelos was on the phone, he appeared to be in good spirits. He shared he has good family support and his kelly in God is a source of strength for him. Provided brief spiritual presence and advised of  Availability.   Visited by: David Auguste [address] ([id number])

## 2017-11-24 NOTE — DIABETES MGMT
DTC Progress Note    Recommendations/ Comments: If appropriate, please consider:   1. Adding carb consistent to diet order for improved glycemic control. 2. May benefit from an increase in Lantus to 18 units daily. Pt required 11 additional units of insulin in past 24 hours. Chart reviewed on [facility]. Patient is a 44 y.o. male with known  Type 2 Diabetes on oral agent (monotherapy): metformin (generic)  insulin injections: Lantus : 54 units daily at home. A1c:   Lab Results   Component Value Date/Time    Hemoglobin A1c 11.3 11/18/2017 02:11 PM       Recent Glucose Results: Lab Results   Component Value Date/Time     (H) 11/24/2017 04:49 AM    GLUCPOC 186 (H) 11/24/2017 07:31 AM    GLUCPOC 394 (H) 11/23/2017 09:19 PM    GLUCPOC 178 (H) 11/23/2017 04:27 PM        Lab Results   Component Value Date/Time    Creatinine 1.10 11/24/2017 04:49 AM     Estimated Creatinine Clearance: 107.9 mL/min (based on Cr of 1.1). Active Orders   Diet    DIET CARDIAC Regular        PO intake: Patient Vitals for the past 72 hrs:   % Diet Eaten   11/23/17 1300 100 %   11/23/17 1000 100 %       Current hospital DM medication: lispro insulin correction scale; lantus 15 units daily    Will continue to follow as needed.     Thank you    Deena Cummins RD, CDE

## 2017-11-24 NOTE — PROGRESS NOTES
[facility] [address]  [address] [facility], [address] [place] [address]  [phone]      Medical Progress Note      NAME: Deedee Simon   :  1980  MRM:  [id number]    Date/Time: 2017         Subjective:     Chief Complaint:  Patient was seen and examined by me. Chart reviewed. Off high flow O2, on 4L. More calm today       Objective:       Vitals:       Last 24hrs VS reviewed since prior progress note.  Most recent are:    Visit Vitals    /82    Pulse 98    Temp 97.9 °F (36.6 °C)    Resp 23    Ht 5' 10\" (1.778 m)    Wt 100 kg (220 lb 7.4 oz)    SpO2 93%    BMI 31.63 kg/m2     SpO2 Readings from Last 6 Encounters:   17 93%    O2 Flow Rate (L/min): 4 l/min       Intake/Output Summary (Last 24 hours) at 17 1003  Last data filed at 17 5784   Gross per 24 hour   Intake          1478.75 ml   Output             2550 ml   Net         -1071.25 ml        Exam:     Physical Exam:    Gen:  Disheveled, obese, NAD  HEENT:  Pink conjunctivae, PERRL, hearing intact to voice, on NC O2  Neck:  Supple, without masses, thyroid non-tender  Resp:  No accessory muscle use, CTAB  Card:  No murmurs, normal S1, S2 without thrills, trace edema  Abd:  Soft, non-tender, non-distended, normoactive bowel sounds are present  Musc:  No cyanosis or clubbing  Skin:  No rashes  Neuro:  Cranial nerves 3-12 are grossly intact, follows commands appropriately  Psych:  Good insight, oriented to person, place and time, alert    Medications Reviewed: (see below)    Lab Data Reviewed: (see below)    ______________________________________________________________________    Medications:     Current Facility-Administered Medications   Medication Dose Route Frequency    insulin glargine (LANTUS) injection 15 Units  15 Units SubCUTAneous ACB    0.9% sodium chloride infusion  75 mL/hr IntraVENous CONTINUOUS    enoxaparin (LOVENOX) injection 40 mg  40 mg SubCUTAneous Q24H    sodium chloride (NS) flush 5-10 mL  5-10 mL IntraVENous Q8H    sodium chloride (NS) flush 5-10 mL  5-10 mL IntraVENous PRN    acetaminophen (TYLENOL) tablet 650 mg  650 mg Oral Q4H PRN    morphine injection 2 mg  2 mg IntraVENous Q4H PRN    naloxone (NARCAN) injection 0.4 mg  0.4 mg IntraVENous PRN    diphenhydrAMINE (BENADRYL) injection 12.5 mg  12.5 mg IntraVENous Q4H PRN    prochlorperazine (COMPAZINE) injection 5 mg  5 mg IntraVENous Q8H PRN    LORazepam (ATIVAN) tablet 1 mg  1 mg Oral BID PRN    insulin lispro (HUMALOG) injection   SubCUTAneous AC&HS    glucose chewable tablet 16 g  4 Tab Oral PRN    dextrose (D50W) injection syrg 12.5-25 g  12.5-25 g IntraVENous PRN    glucagon (GLUCAGEN) injection 1 mg  1 mg IntraMUSCular PRN    aspirin delayed-release tablet 81 mg  81 mg Oral DAILY    dilTIAZem CD (CARDIZEM CD) capsule 240 mg  240 mg Oral DAILY    gemfibrozil (LOPID) tablet 600 mg  600 mg Oral BID    metoprolol succinate (TOPROL-XL) XL tablet 50 mg  50 mg Oral DAILY          Lab Review:     Recent Labs      11/24/17 0449   WBC  7.3   HGB  12.5   HCT  39.8   PLT  403*     Recent Labs      11/24/17   0449  11/23/17   0158  11/22/17   1215  11/22/17   0245   NA  146*  143  136  137   K  4.3  4.1  5.0  6.1*   CL  108  103  95*  99   CO2  33*  37*  33*  33*   GLU  210*  280*  408*  304*   BUN  39*  60*  74*  79*   CREA  1.10  1.49*  2.00*  2.01*   CA  8.6  8.6  9.4  9.2   MG  2.1  2.4   --   2.5*   PHOS  3.6  3.7   --   4.9*     Lab Results   Component Value Date/Time    Glucose (POC) 186 11/24/2017 07:31 AM    Glucose (POC) 394 11/23/2017 09:19 PM    Glucose (POC) 178 11/23/2017 04:27 PM    Glucose (POC) 128 11/23/2017 10:56 AM    Glucose (POC) 174 11/23/2017 07:24 AM          Assessment / Plan: Active Problems:    43 yo hx of HTN, DM, CAROLIN, presented w/ resp failure, severe hypoxia, CHF, likely from severe CAROLIN and pulm HTN    1) Acute on chronic resp failure/severe hypoxia/hypercapnea: O2 sat ~70% on RA.   PCO2 72.  Likely due to severe CAROLIN and pulm HTN. Hypoxia is improving. Now on 4L O2. Large V/Q mismatch. Chest CTA neg for PE. Right heart cath showed severe pulm HTN. Will cont O2 (weaning), bipap. Pulm following, plan for home Trilogy, home O2. CM working on this    2) Acute CHF: diastolic, echo with EF 20-39%. Volume is stable. Holding diuretics. Cards following    3) MANOHAR: Improving. Could be cardio-renal syndrome/ATN vs progression of CKD. Renal following    4) Hyperkalemia: now improved. Due to MANOHAR. S/p kayexalate x1. Will monitor BMP    5) HTN: cont metoprolol, Dilt    6) Type 2 diabetes mellitus with renal complication: J0M 80.3%. BS worse from IV steroids (steroids discontinued 11/22). Holding metformin. Cont Lantus, cont SSI. DM educator following    7) CAROLIN: severe. Will need home O2, Trilogy. Follows by a pulmonologist in [place]    8) Social: patient has been very disgruntled and beligerent toward MD and nursing regarding his medical issues. He kept threatening to leave AMA and stated that he will not use O2 at home. He's also concerned about not being able to work. Will continue to listen and discuss patient's concerns.   The patient is more calm today    Total time spent with patient: 20 min                   Care Plan discussed with: Patient, nursing, pulm, renal    Discussed:  Care Plan    Prophylaxis:  Lovenox    Disposition:   PT, OT, RN, home O2, Trilogy           ___________________________________________________    Attending Physician: Shaunna Ma MD

## 2017-11-24 NOTE — PROGRESS NOTES
Pt has been going to bathroom by self though nurse told him to call for help. When up he is steady gait, not checked with nurse took of nc 4-5 liter. Rechecked being settled down showing o2 off <87%. Being re-educated NC has to be on all time.

## 2017-11-24 NOTE — PROGRESS NOTES
11- CASE MANAGEMENT NOTE:  I heard from Carolee File at [facility] that his insurance has not approved a Trilogy machine without a diagnosis of COPD but they are still trying to get insurance approval. She did say they will approve a BiPaP and I sent an order for this to [facility] thru [employer]. I fear we will have a major problem trying to get the [facility] as Carolee File said it would take his insurance company 2 weeks or so to approve a portable concentrator. Perhaps he would be willing to remain in [place] with a regular concentrator until a portable one can be authorized by his insurance. CM will continue to follow.  LYNN Lee, CM

## 2017-11-24 NOTE — PROGRESS NOTES
..                              8 Kylah Vazquez  YOB: 1980          Assessment & Plan:   1. MANOHAR  - resolving   - UO remains up with a negative fluid balance on IVF  - probably can stop the IVF  2. Resp Failure  - breathing is pretty good and he denies sob although he is on oxygen  3. HTN  4. CHF       Subjective:   CC: MANOHAR  HPI: Patient denies sob. Says he feels well. Denies cp, f/c/ns, n/v/d/abdominal pain. ROS:See HPI  Current Facility-Administered Medications   Medication Dose Route Frequency    insulin glargine (LANTUS) injection 15 Units  15 Units SubCUTAneous ACB    0.9% sodium chloride infusion  75 mL/hr IntraVENous CONTINUOUS    enoxaparin (LOVENOX) injection 40 mg  40 mg SubCUTAneous Q24H    sodium chloride (NS) flush 5-10 mL  5-10 mL IntraVENous Q8H    sodium chloride (NS) flush 5-10 mL  5-10 mL IntraVENous PRN    acetaminophen (TYLENOL) tablet 650 mg  650 mg Oral Q4H PRN    morphine injection 2 mg  2 mg IntraVENous Q4H PRN    naloxone (NARCAN) injection 0.4 mg  0.4 mg IntraVENous PRN    diphenhydrAMINE (BENADRYL) injection 12.5 mg  12.5 mg IntraVENous Q4H PRN    prochlorperazine (COMPAZINE) injection 5 mg  5 mg IntraVENous Q8H PRN    LORazepam (ATIVAN) tablet 1 mg  1 mg Oral BID PRN    insulin lispro (HUMALOG) injection   SubCUTAneous AC&HS    glucose chewable tablet 16 g  4 Tab Oral PRN    dextrose (D50W) injection syrg 12.5-25 g  12.5-25 g IntraVENous PRN    glucagon (GLUCAGEN) injection 1 mg  1 mg IntraMUSCular PRN    aspirin delayed-release tablet 81 mg  81 mg Oral DAILY    dilTIAZem CD (CARDIZEM CD) capsule 240 mg  240 mg Oral DAILY    gemfibrozil (LOPID) tablet 600 mg  600 mg Oral BID    metoprolol succinate (TOPROL-XL) XL tablet 50 mg  50 mg Oral DAILY          Objective:     Vitals:  Blood pressure 139/82, pulse 98, temperature 97.9 °F (36.6 °C), resp.  rate 23, height 5' 10\" (1.778 m), weight 100 kg (220 lb 7.4 oz), SpO2 93 %.  Temp (24hrs), Av.2 °F (36.8 °C), Min:97.9 °F (36.6 °C), Max:98.7 °F (37.1 °C)      Intake and Output:      1901 -  0700  In: 1718.8 [P.O.:945; I.V.:773.8]  Out: 9041 [Urine:3850]    Physical Exam:                Patient is intubated:  NO    Physical Examination:   GENERAL ASSESSMENT: NAD  HEENT:Nontraumatic   CHEST: CTA  HEART: S1S2  ABDOMEN: Soft,NT,  Skin: warm and dry  EXTREMITY: no sig edema  NEURO:Grossly non focal          ECG/rhythm:    Data Review      No results for input(s): TNIPOC in the last 72 hours. No lab exists for component: ITNL   No results for input(s): CPK, CKMB, TROIQ in the last 72 hours. Recent Labs      17   0449  17   0158  17   1215  17   0245   NA  146*  143  136  137   K  4.3  4.1  5.0  6.1*   CL  108  103  95*  99   CO2  33*  37*  33*  33*   BUN  39*  60*  74*  79*   CREA  1.10  1.49*  2.00*  2.01*   GLU  210*  280*  408*  304*   PHOS  3.6  3.7   --   4.9*   MG  2.1  2.4   --   2.5*   CA  8.6  8.6  9.4  9.2   WBC  7.3   --    --    --    HGB  12.5   --    --    --    HCT  39.8   --    --    --    PLT  403*   --    --    --       No results for input(s): INR, PTP, APTT in the last 72 hours. No lab exists for component: INREXT  Needs: urine analysis, urine sodium, protein and creatinine  Lab Results   Component Value Date/Time    Creatinine, urine 66.13 2017 10:50 AM         Discussed with:  Pt and his nurse    : Pauline Martin MD  2017        [facility]:  [email or web address]ology[facility] Hospital[facility]. [place]  [facility] office:  [address] [place] [address] [address]  [place], [address] [facility]  Phone: [phone]  Fax :     [phone]    [place] office:  [address] [facility]  [place], [address] [place] [address] [address]  Phone - [phone]  Fax - [phone]

## 2017-11-24 NOTE — PROGRESS NOTES
J69- CASE MANAGEMENT NOTE:  I have been working with Dr. Nelson Hugo to try to order a Trilogy machine for the pt and the need to use a company that is nationwide as the pt will be returning to [place]. I attempted to continue to work with [facility] as the previous CM had spoken to but they are closed today and I was not able to speak directly with anyone. I did speak with Ayleen Moore who is open but will not be able to obtain a Trilogy until Mon (11-) and per their rep. Rajni Deshawn ([phone]), I sent the order thru [employer] to them and they will begin insurance authorization . I also explained this to the pt and he was accepting of staying thru the weekend. CM will continue to follow and assist with discharge arrangements.  Babak Palmer, BSW, CM

## 2017-11-24 NOTE — CARDIO/PULMONARY
Cardiac Rehab: Following patient for CHF education. He has not been receptive to teaching. Hgb A1c 11.3 (11/18/17). Met briefly with Desiree Owen, who greeted this nurse pleasantly. 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 Has been frustrated with his breathing problem. Aware of recommendation for Trilogy. Reinforced importance of following MD treatment recommendations for CAROLIN, to perhaps improve PaHTN and breathe better. Pt indicated he has not read HF education folder yet. Encouraged him to do so, and ask questions about anything that is not clear. Offered emotional support and encouragement.

## 2017-11-24 NOTE — DISCHARGE SUMMARY
This is an interim summary from 11/20 to 11/24    Admission date: 11/18  Admission diagnosis: acute on chronic resp failure/hypoxia/hypercapnea  Consults: Pulm, Cards, Renal    43 yo hx of HTN, DM, CAROLIN, presented w/ resp failure, severe hypoxia, CHF, likely from severe CAROLIN and pulm HTN. Hospital course complicated by MANOHAR, which is improving. The patient received a right heart cath which showed severe pulm HTN. Chest CTA was neg for PE. He was on high flow O2, now weaned to 4L. O2 sat ~70% on RA with a PCO2 of 72. Pulmonary and CM are coordinating home O2 and Trilogy or bipap.       Current Meds:    Current Facility-Administered Medications   Medication Dose Route Frequency    insulin glargine (LANTUS) injection 15 Units  15 Units SubCUTAneous ACB    0.9% sodium chloride infusion  75 mL/hr IntraVENous CONTINUOUS    enoxaparin (LOVENOX) injection 40 mg  40 mg SubCUTAneous Q24H    sodium chloride (NS) flush 5-10 mL  5-10 mL IntraVENous Q8H    sodium chloride (NS) flush 5-10 mL  5-10 mL IntraVENous PRN    acetaminophen (TYLENOL) tablet 650 mg  650 mg Oral Q4H PRN    morphine injection 2 mg  2 mg IntraVENous Q4H PRN    naloxone (NARCAN) injection 0.4 mg  0.4 mg IntraVENous PRN    diphenhydrAMINE (BENADRYL) injection 12.5 mg  12.5 mg IntraVENous Q4H PRN    prochlorperazine (COMPAZINE) injection 5 mg  5 mg IntraVENous Q8H PRN    LORazepam (ATIVAN) tablet 1 mg  1 mg Oral BID PRN    insulin lispro (HUMALOG) injection   SubCUTAneous AC&HS    glucose chewable tablet 16 g  4 Tab Oral PRN    dextrose (D50W) injection syrg 12.5-25 g  12.5-25 g IntraVENous PRN    glucagon (GLUCAGEN) injection 1 mg  1 mg IntraMUSCular PRN    aspirin delayed-release tablet 81 mg  81 mg Oral DAILY    dilTIAZem CD (CARDIZEM CD) capsule 240 mg  240 mg Oral DAILY    gemfibrozil (LOPID) tablet 600 mg  600 mg Oral BID    metoprolol succinate (TOPROL-XL) XL tablet 50 mg  50 mg Oral DAILY

## 2017-11-25 LAB
ALBUMIN SERPL-MCNC: 2.3 G/DL (ref 3.5–5)
ANA SER QL: NEGATIVE
ANION GAP SERPL CALC-SCNC: 6 MMOL/L (ref 5–15)
ANION GAP SERPL CALC-SCNC: 7 MMOL/L (ref 5–15)
BUN SERPL-MCNC: 28 MG/DL (ref 6–20)
BUN SERPL-MCNC: 29 MG/DL (ref 6–20)
BUN/CREAT SERPL: 30 (ref 12–20)
BUN/CREAT SERPL: 31 (ref 12–20)
CALCIUM SERPL-MCNC: 8.2 MG/DL (ref 8.5–10.1)
CALCIUM SERPL-MCNC: 9.1 MG/DL (ref 8.5–10.1)
CHLORIDE SERPL-SCNC: 104 MMOL/L (ref 97–108)
CHLORIDE SERPL-SCNC: 108 MMOL/L (ref 97–108)
CO2 SERPL-SCNC: 31 MMOL/L (ref 21–32)
CO2 SERPL-SCNC: 33 MMOL/L (ref 21–32)
CREAT SERPL-MCNC: 0.9 MG/DL (ref 0.7–1.3)
CREAT SERPL-MCNC: 0.97 MG/DL (ref 0.7–1.3)
GLUCOSE BLD STRIP.AUTO-MCNC: 106 MG/DL (ref 65–100)
GLUCOSE BLD STRIP.AUTO-MCNC: 135 MG/DL (ref 65–100)
GLUCOSE BLD STRIP.AUTO-MCNC: 195 MG/DL (ref 65–100)
GLUCOSE BLD STRIP.AUTO-MCNC: 231 MG/DL (ref 65–100)
GLUCOSE SERPL-MCNC: 101 MG/DL (ref 65–100)
GLUCOSE SERPL-MCNC: 261 MG/DL (ref 65–100)
MAGNESIUM SERPL-MCNC: 1.9 MG/DL (ref 1.6–2.4)
PHOSPHATE SERPL-MCNC: 3.9 MG/DL (ref 2.6–4.7)
POTASSIUM SERPL-SCNC: 4.3 MMOL/L (ref 3.5–5.1)
POTASSIUM SERPL-SCNC: 4.3 MMOL/L (ref 3.5–5.1)
SERVICE CMNT-IMP: ABNORMAL
SODIUM SERPL-SCNC: 142 MMOL/L (ref 136–145)
SODIUM SERPL-SCNC: 147 MMOL/L (ref 136–145)

## 2017-11-25 PROCEDURE — 74011636637 HC RX REV CODE- 636/637: Performed by: INTERNAL MEDICINE

## 2017-11-25 PROCEDURE — 82962 GLUCOSE BLOOD TEST: CPT

## 2017-11-25 PROCEDURE — 80069 RENAL FUNCTION PANEL: CPT | Performed by: INTERNAL MEDICINE

## 2017-11-25 PROCEDURE — 36415 COLL VENOUS BLD VENIPUNCTURE: CPT | Performed by: INTERNAL MEDICINE

## 2017-11-25 PROCEDURE — 94660 CPAP INITIATION&MGMT: CPT

## 2017-11-25 PROCEDURE — 77010033678 HC OXYGEN DAILY

## 2017-11-25 PROCEDURE — 74011250637 HC RX REV CODE- 250/637: Performed by: INTERNAL MEDICINE

## 2017-11-25 PROCEDURE — 65660000000 HC RM CCU STEPDOWN

## 2017-11-25 PROCEDURE — 74011250636 HC RX REV CODE- 250/636: Performed by: INTERNAL MEDICINE

## 2017-11-25 PROCEDURE — 83735 ASSAY OF MAGNESIUM: CPT | Performed by: INTERNAL MEDICINE

## 2017-11-25 PROCEDURE — 80048 BASIC METABOLIC PNL TOTAL CA: CPT | Performed by: INTERNAL MEDICINE

## 2017-11-25 RX ORDER — FUROSEMIDE 20 MG/1
20 TABLET ORAL DAILY
Status: DISCONTINUED | OUTPATIENT
Start: 2017-11-26 | End: 2017-11-27 | Stop reason: HOSPADM

## 2017-11-25 RX ADMIN — ASPIRIN 81 MG: 81 TABLET, COATED ORAL at 08:41

## 2017-11-25 RX ADMIN — Medication 10 ML: at 14:00

## 2017-11-25 RX ADMIN — GEMFIBROZIL 600 MG: 600 TABLET ORAL at 17:17

## 2017-11-25 RX ADMIN — INSULIN GLARGINE 15 UNITS: 100 INJECTION, SOLUTION SUBCUTANEOUS at 12:13

## 2017-11-25 RX ADMIN — ENOXAPARIN SODIUM 40 MG: 40 INJECTION SUBCUTANEOUS at 17:17

## 2017-11-25 RX ADMIN — INSULIN LISPRO 2 UNITS: 100 INJECTION, SOLUTION INTRAVENOUS; SUBCUTANEOUS at 16:40

## 2017-11-25 RX ADMIN — Medication 10 ML: at 21:13

## 2017-11-25 RX ADMIN — GEMFIBROZIL 600 MG: 600 TABLET ORAL at 08:41

## 2017-11-25 RX ADMIN — METOPROLOL SUCCINATE 50 MG: 50 TABLET, FILM COATED, EXTENDED RELEASE ORAL at 08:41

## 2017-11-25 RX ADMIN — DILTIAZEM HYDROCHLORIDE 240 MG: 240 CAPSULE, COATED, EXTENDED RELEASE ORAL at 08:41

## 2017-11-25 NOTE — PROGRESS NOTES
Problem: Falls - Risk of  Goal: *Absence of Falls  Document Felipe Fall Risk and appropriate interventions in the flowsheet.    Outcome: Progressing Towards Goal  Fall Risk Interventions:  Mobility Interventions: Bed/chair exit alarm         Medication Interventions: Assess postural VS orthostatic hypotension         History of Falls Interventions: Bed/chair exit alarm

## 2017-11-25 NOTE — PROGRESS NOTES
Problem: Breathing Pattern - Ineffective  Goal: *Absence of hypoxia  Outcome: Progressing Towards Goal  While resting in bed, Pt maintained o2 sat from 88-93% when o2 weaned down from 4 liter to 3 liter. Denied SOB though purse lip breathing all times.

## 2017-11-25 NOTE — ROUTINE PROCESS
Bedside and Verbal shift change report given to Nida Tee RN/ANTONIA Arredondo (oncoming nurse) by Zeenat Lynch RN (offgoing nurse). Report included the following information SBAR, Kardex, Intake/Output, MAR, Accordion, Recent Results, Cardiac Rhythm SR and Alarm Parameters .

## 2017-11-25 NOTE — PROGRESS NOTES
Kyle Quick [facility]  [address] [facility], [address] [place] [address]  [phone]      Medical Progress Note      NAME: Matty Pepper   :  1980  MRM:  [id number]    Date/Time: 2017         Subjective:     Chief Complaint:  Patient was seen and examined by me. Chart reviewed    \"I'm okay\"     Pt without complaints. Aware that bipap is needing to be arranged prior to d/c        Objective:       Vitals:       Last 24hrs VS reviewed since prior progress note.  Most recent are:    Visit Vitals    /89 (BP 1 Location: Right arm, BP Patient Position: At rest)    Pulse 92    Temp 98.6 °F (37 °C)    Resp 22    Ht 5' 10\" (1.778 m)    Wt 99.5 kg (219 lb 5.7 oz)    SpO2 90%    BMI 31.47 kg/m2     SpO2 Readings from Last 6 Encounters:   17 90%    O2 Flow Rate (L/min): 4 l/min       Intake/Output Summary (Last 24 hours) at 17 1646  Last data filed at 17 1612   Gross per 24 hour   Intake              750 ml   Output             3100 ml   Net            -2350 ml        Exam:     Physical Exam:    Gen:  Disheveled, obese, NAD  HEENT:  Pink conjunctivae, PERRL, hearing intact to voice, on NC O2  Neck:  Supple, without masses, thyroid non-tender  Resp:  No accessory muscle use, CTAB  Card:  No murmurs, normal S1, S2 without thrills, trace edema  Abd:  Soft, non-tender, non-distended, normoactive bowel sounds are present  Musc:  No cyanosis or clubbing  Skin:  No rashes  Neuro:  Cranial nerves 3-12 are grossly intact, follows commands appropriately  Psych:  Good insight, oriented to person, place and time, alert    Medications Reviewed: (see below)    Lab Data Reviewed: (see below)    ______________________________________________________________________    Medications:     Current Facility-Administered Medications   Medication Dose Route Frequency    insulin glargine (LANTUS) injection 15 Units  15 Units SubCUTAneous ACB    enoxaparin (LOVENOX) injection 40 mg  40 mg SubCUTAneous Q24H    sodium chloride (NS) flush 5-10 mL  5-10 mL IntraVENous Q8H    sodium chloride (NS) flush 5-10 mL  5-10 mL IntraVENous PRN    acetaminophen (TYLENOL) tablet 650 mg  650 mg Oral Q4H PRN    morphine injection 2 mg  2 mg IntraVENous Q4H PRN    naloxone (NARCAN) injection 0.4 mg  0.4 mg IntraVENous PRN    diphenhydrAMINE (BENADRYL) injection 12.5 mg  12.5 mg IntraVENous Q4H PRN    prochlorperazine (COMPAZINE) injection 5 mg  5 mg IntraVENous Q8H PRN    LORazepam (ATIVAN) tablet 1 mg  1 mg Oral BID PRN    insulin lispro (HUMALOG) injection   SubCUTAneous AC&HS    glucose chewable tablet 16 g  4 Tab Oral PRN    dextrose (D50W) injection syrg 12.5-25 g  12.5-25 g IntraVENous PRN    glucagon (GLUCAGEN) injection 1 mg  1 mg IntraMUSCular PRN    aspirin delayed-release tablet 81 mg  81 mg Oral DAILY    dilTIAZem CD (CARDIZEM CD) capsule 240 mg  240 mg Oral DAILY    gemfibrozil (LOPID) tablet 600 mg  600 mg Oral BID    metoprolol succinate (TOPROL-XL) XL tablet 50 mg  50 mg Oral DAILY          Lab Review:     Recent Labs      11/24/17   0449   WBC  7.3   HGB  12.5   HCT  39.8   PLT  403*     Recent Labs      11/25/17   1400  11/25/17   0425  11/24/17   0449  11/23/17   0158   NA  142  147*  146*  143   K  4.3  4.3  4.3  4.1   CL  104  108  108  103   CO2  31  33*  33*  37*   GLU  261*  101*  210*  280*   BUN  29*  28*  39*  60*   CREA  0.97  0.90  1.10  1.49*   CA  8.2*  9.1  8.6  8.6   MG   --   1.9  2.1  2.4   PHOS  3.9   --   3.6  3.7   ALB  2.3*   --    --    --      Lab Results   Component Value Date/Time    Glucose (POC) 231 11/25/2017 04:18 PM    Glucose (POC) 135 11/25/2017 12:06 PM    Glucose (POC) 106 11/25/2017 07:43 AM    Glucose (POC) 115 11/24/2017 09:55 PM    Glucose (POC) 142 11/24/2017 05:16 PM          Acute on chronic resp failure/severe hypoxia/hypercapnea: O2 sat ~70% on RA. PCO2 72. Likely due to severe CAROLIN (with CPAP non-compliance) and pulm HTN.  CTA without PE. R heart cath => pulm HTN  -pulm on board  -continue biPAP qHS while in house   -CM trying to arrange home biPAP qHS     Acute CHF: diastolic, echo with EF 43-34%  -s/p diuresis which was stopped due to MANOHAR   -resume low dose PO diuretic and monitor     MANOHAR: improved. ? etiology  -monitor with diuresis     Hyperkalemia: now improved. Due to MANOHAR.   S/p kayexalate x1  -monitor     HTN:   -on metoprolol and dilt    Type 2 diabetes mellitus with renal complication: Z7V 77.0%  -ISS   -BG checks AC TID and qHS   -continue Lantus; uptitrate PRN     CAROLIN: severe  -home O2 and biPAP to be arranged     Total time spent with patient: 35 min                   Care Plan discussed with: Patient, nursing, pulm, renal    Discussed:  Care Plan    Prophylaxis:  Lovenox    Disposition:   PT, OT, RN, home O2, BiPAP            ___________________________________________________    Attending Physician: Romana Areola, MD

## 2017-11-25 NOTE — ROUTINE PROCESS
Bedside and Verbal shift change report given to [employer] [place] RN (oncoming nurse) by Angela Blanchard RN (offgoing nurse). Report included the following information SBAR, Kardex, Intake/Output, MAR, Accordion, Recent Results, Cardiac Rhythm SR and Alarm Parameters .

## 2017-11-26 LAB
ANION GAP SERPL CALC-SCNC: 5 MMOL/L (ref 5–15)
BASOPHILS # BLD: 0 K/UL (ref 0–0.1)
BASOPHILS NFR BLD: 0 % (ref 0–1)
BNP SERPL-MCNC: 271 PG/ML (ref 0–125)
BUN SERPL-MCNC: 27 MG/DL (ref 6–20)
BUN/CREAT SERPL: 29 (ref 12–20)
CALCIUM SERPL-MCNC: 8.9 MG/DL (ref 8.5–10.1)
CHLORIDE SERPL-SCNC: 105 MMOL/L (ref 97–108)
CO2 SERPL-SCNC: 33 MMOL/L (ref 21–32)
CREAT SERPL-MCNC: 0.94 MG/DL (ref 0.7–1.3)
EOSINOPHIL # BLD: 0.3 K/UL (ref 0–0.4)
EOSINOPHIL NFR BLD: 4 % (ref 0–7)
ERYTHROCYTE [DISTWIDTH] IN BLOOD BY AUTOMATED COUNT: 13.4 % (ref 11.5–14.5)
GLUCOSE BLD STRIP.AUTO-MCNC: 120 MG/DL (ref 65–100)
GLUCOSE BLD STRIP.AUTO-MCNC: 150 MG/DL (ref 65–100)
GLUCOSE BLD STRIP.AUTO-MCNC: 215 MG/DL (ref 65–100)
GLUCOSE BLD STRIP.AUTO-MCNC: 236 MG/DL (ref 65–100)
GLUCOSE SERPL-MCNC: 130 MG/DL (ref 65–100)
HCT VFR BLD AUTO: 39.3 % (ref 36.6–50.3)
HGB BLD-MCNC: 12.5 G/DL (ref 12.1–17)
LYMPHOCYTES # BLD: 2.2 K/UL (ref 0.8–3.5)
LYMPHOCYTES NFR BLD: 33 % (ref 12–49)
MCH RBC QN AUTO: 26.8 PG (ref 26–34)
MCHC RBC AUTO-ENTMCNC: 31.8 G/DL (ref 30–36.5)
MCV RBC AUTO: 84.2 FL (ref 80–99)
MONOCYTES # BLD: 0.5 K/UL (ref 0–1)
MONOCYTES NFR BLD: 8 % (ref 5–13)
NEUTS SEG # BLD: 3.6 K/UL (ref 1.8–8)
NEUTS SEG NFR BLD: 55 % (ref 32–75)
PLATELET # BLD AUTO: 401 K/UL (ref 150–400)
POTASSIUM SERPL-SCNC: 4.1 MMOL/L (ref 3.5–5.1)
RBC # BLD AUTO: 4.67 M/UL (ref 4.1–5.7)
SERVICE CMNT-IMP: ABNORMAL
SODIUM SERPL-SCNC: 143 MMOL/L (ref 136–145)
WBC # BLD AUTO: 6.6 K/UL (ref 4.1–11.1)

## 2017-11-26 PROCEDURE — 36415 COLL VENOUS BLD VENIPUNCTURE: CPT | Performed by: INTERNAL MEDICINE

## 2017-11-26 PROCEDURE — 74011250637 HC RX REV CODE- 250/637: Performed by: INTERNAL MEDICINE

## 2017-11-26 PROCEDURE — 82962 GLUCOSE BLOOD TEST: CPT

## 2017-11-26 PROCEDURE — 85025 COMPLETE CBC W/AUTO DIFF WBC: CPT | Performed by: INTERNAL MEDICINE

## 2017-11-26 PROCEDURE — 77010033678 HC OXYGEN DAILY

## 2017-11-26 PROCEDURE — 83880 ASSAY OF NATRIURETIC PEPTIDE: CPT | Performed by: INTERNAL MEDICINE

## 2017-11-26 PROCEDURE — 74011250636 HC RX REV CODE- 250/636: Performed by: INTERNAL MEDICINE

## 2017-11-26 PROCEDURE — 94660 CPAP INITIATION&MGMT: CPT

## 2017-11-26 PROCEDURE — 80048 BASIC METABOLIC PNL TOTAL CA: CPT | Performed by: INTERNAL MEDICINE

## 2017-11-26 PROCEDURE — 65660000000 HC RM CCU STEPDOWN

## 2017-11-26 PROCEDURE — 74011636637 HC RX REV CODE- 636/637: Performed by: INTERNAL MEDICINE

## 2017-11-26 RX ADMIN — INSULIN LISPRO 3 UNITS: 100 INJECTION, SOLUTION INTRAVENOUS; SUBCUTANEOUS at 16:53

## 2017-11-26 RX ADMIN — GEMFIBROZIL 600 MG: 600 TABLET ORAL at 18:36

## 2017-11-26 RX ADMIN — GEMFIBROZIL 600 MG: 600 TABLET ORAL at 08:22

## 2017-11-26 RX ADMIN — INSULIN LISPRO 3 UNITS: 100 INJECTION, SOLUTION INTRAVENOUS; SUBCUTANEOUS at 12:06

## 2017-11-26 RX ADMIN — FUROSEMIDE 20 MG: 20 TABLET ORAL at 08:23

## 2017-11-26 RX ADMIN — Medication 10 ML: at 14:00

## 2017-11-26 RX ADMIN — DILTIAZEM HYDROCHLORIDE 240 MG: 240 CAPSULE, COATED, EXTENDED RELEASE ORAL at 08:22

## 2017-11-26 RX ADMIN — INSULIN GLARGINE 15 UNITS: 100 INJECTION, SOLUTION SUBCUTANEOUS at 08:22

## 2017-11-26 RX ADMIN — ASPIRIN 81 MG: 81 TABLET, COATED ORAL at 08:22

## 2017-11-26 RX ADMIN — ENOXAPARIN SODIUM 40 MG: 40 INJECTION SUBCUTANEOUS at 15:58

## 2017-11-26 RX ADMIN — Medication 10 ML: at 21:30

## 2017-11-26 RX ADMIN — Medication 10 ML: at 06:44

## 2017-11-26 RX ADMIN — METOPROLOL SUCCINATE 50 MG: 50 TABLET, FILM COATED, EXTENDED RELEASE ORAL at 08:23

## 2017-11-26 NOTE — PROGRESS NOTES
Name: [facility]: [address]   : 1980 Admit Date: 2017   Phone:   Room: 324/01   PCP: Michele Cooper MD  MRN: [id number]   Date: 2017  Code: Full Code        Chart and notes reviewed. Data reviewed. I review the patient's current medications in the medical record at each encounter. I have evaluated and examined the patient. Overnight events  Down to 2-3 liters O2 and maintaining in low to mid 90s  Denies SOB. Very worried about needing oxygen as an outpt and needing to quit his job  Creat nl  I/O: -2500    Current Facility-Administered Medications   Medication Dose Route Frequency    furosemide (LASIX) tablet 20 mg  20 mg Oral DAILY    insulin glargine (LANTUS) injection 15 Units  15 Units SubCUTAneous ACB    enoxaparin (LOVENOX) injection 40 mg  40 mg SubCUTAneous Q24H    sodium chloride (NS) flush 5-10 mL  5-10 mL IntraVENous Q8H    sodium chloride (NS) flush 5-10 mL  5-10 mL IntraVENous PRN    acetaminophen (TYLENOL) tablet 650 mg  650 mg Oral Q4H PRN    morphine injection 2 mg  2 mg IntraVENous Q4H PRN    naloxone (NARCAN) injection 0.4 mg  0.4 mg IntraVENous PRN    diphenhydrAMINE (BENADRYL) injection 12.5 mg  12.5 mg IntraVENous Q4H PRN    prochlorperazine (COMPAZINE) injection 5 mg  5 mg IntraVENous Q8H PRN    LORazepam (ATIVAN) tablet 1 mg  1 mg Oral BID PRN    insulin lispro (HUMALOG) injection   SubCUTAneous AC&HS    glucose chewable tablet 16 g  4 Tab Oral PRN    dextrose (D50W) injection syrg 12.5-25 g  12.5-25 g IntraVENous PRN    glucagon (GLUCAGEN) injection 1 mg  1 mg IntraMUSCular PRN    aspirin delayed-release tablet 81 mg  81 mg Oral DAILY    dilTIAZem CD (CARDIZEM CD) capsule 240 mg  240 mg Oral DAILY    gemfibrozil (LOPID) tablet 600 mg  600 mg Oral BID    metoprolol succinate (TOPROL-XL) XL tablet 50 mg  50 mg Oral DAILY         REVIEW OF SYSTEMS   12 point ROS negative except as stated in the HPI.       Physical Exam:   Visit Vitals    /77 (BP 1 Location: Right arm, BP Patient Position: At rest)    Pulse 81    Temp 98.3 °F (36.8 °C)    Resp 18    Ht 5' 10\" (1.778 m)    Wt 99.5 kg (219 lb 5.7 oz)    SpO2 98%    BMI 31.47 kg/m2       General:  Alert, cooperative, no distress, appears stated age   Head:  Normocephalic, without obvious abnormality, atraumatic. Eyes:  Conjunctivae/corneas clear. Nose: Nares normal. Septum midline. Mucosa normal.    Throat: Lips, mucosa, and tongue normal.    Neck: Supple, symmetrical, trachea midline. Lungs:   Diminished, but clear to auscultation bilaterally. No wheeze or rales appreciated. Chest wall:  No tenderness or deformity. Heart:  Regular rhythm, no murmur, click, rub or gallop. Abdomen:   Soft, non-tender. Bowel sounds normal. No masses,  No organomegaly. Extremities: Extremities normal, atraumatic, no cyanosis, trace LE edema. Pulses: 2+ and symmetric all extremities. Skin: Skin color, texture, turgor normal. No rashes or lesions   Neurologic: Grossly nonfocal       Lab Results   Component Value Date/Time    Sodium 143 11/26/2017 06:50 AM    Potassium 4.1 11/26/2017 06:50 AM    Chloride 105 11/26/2017 06:50 AM    CO2 33 11/26/2017 06:50 AM    BUN 27 11/26/2017 06:50 AM    Creatinine 0.94 11/26/2017 06:50 AM    Glucose 130 11/26/2017 06:50 AM    Calcium 8.9 11/26/2017 06:50 AM    Magnesium 1.9 11/25/2017 04:25 AM    Phosphorus 3.9 11/25/2017 02:00 PM       Lab Results   Component Value Date/Time    WBC 6.6 11/26/2017 06:50 AM    HGB 12.5 11/26/2017 06:50 AM    PLATELET 734 81/86/9832 06:50 AM    MCV 84.2 11/26/2017 06:50 AM       Lab Results   Component Value Date/Time    AST (SGOT) 10 11/19/2017 02:50 AM    Alk.  phosphatase 73 11/19/2017 02:50 AM    Protein, total 7.6 11/19/2017 02:50 AM    Albumin 2.3 11/25/2017 02:00 PM    Globulin 5.2 11/19/2017 02:50 AM       No results found for: IRON, FE, TIBC, IBCT, PSAT, FERR    No results found for: SR, CRP, YASMIN, ANAIGG, RA, RPR, RPRT, VDRLT, VDRLS, TSH, TSHEXT, TSHEXT     No results found for: PH, PHI, PCO2, PCO2I, PO2, PO2I, HCO3, HCO3I, FIO2, FIO2I    Lab Results   Component Value Date/Time    Troponin-I, Qt. <0.04 11/19/2017 02:50 AM        No results found for: CULT    Lab Results   Component Value Date/Time    Hepatitis B surface Ag <0.10 11/21/2017 11:07 AM       No results found for: VANCT, CPK    Lab Results   Component Value Date/Time    Color YELLOW/STRAW 11/21/2017 10:50 AM    Appearance CLEAR 11/21/2017 10:50 AM    pH (UA) 5.5 11/21/2017 10:50 AM    Protein 100 11/21/2017 10:50 AM    Glucose NEGATIVE  11/21/2017 10:50 AM    Ketone NEGATIVE  11/21/2017 10:50 AM    Bilirubin NEGATIVE  11/21/2017 10:50 AM    Blood SMALL 11/21/2017 10:50 AM    Urobilinogen 0.2 11/21/2017 10:50 AM    Nitrites NEGATIVE  11/21/2017 10:50 AM    Leukocyte Esterase NEGATIVE  11/21/2017 10:50 AM    WBC 0-4 11/21/2017 10:50 AM    RBC 0-5 11/21/2017 10:50 AM    Bacteria NEGATIVE  11/21/2017 10:50 AM         Images: personally visualized    Chest CTA (11/21/17): No PE. There is atelectasis in the lower lobes. The upper lung zones are clear. No pulm edema or pleural effusions.      IMPRESSION  · Chronic respiratory failure  · Chronic hypercapnea  · Hypoxia  · PulmonaryHTN due to uncontrolled CAROLIN  · CAROLIN: Sleep study 2016 severe- AHI 50-60, desaturating to the 70s for extended amounts of time. · DM  · HTN      PLAN  · Continue O2 and wean as able to keep sats > 88% at baseline. Pt may have some VQ missmatch due to atx and would keep weaning if sats remain around 88-95% to get an idea of pts O2 requirement  · Pt will need NIPPV as outpt for his chronic CO2 retention and CAROLIN. Previous bipap settings 15/8. His pulmonarty HTN mainly due to uncontrolled sleep apnea with some component of left sided heart failure. Long term may improve some if well managed.    · Continue PO lasix  · Rate control with metoprolol; also on Cardizem for HTN  · BiPAP nightly and with naps  · IS/OOB to chair  · Needs complete PFTs outpatient  · DVT prophylaxis: sub q heparin    Bipap ordered for pt for chronic respiratory failure due to severe CAROLIN.  Previous setting 15/8 as outpt but will need O2 at night as well at this discharge    Falguni Sanders MD

## 2017-11-26 NOTE — PROGRESS NOTES
Problem: Breathing Pattern - Ineffective  Goal: *Absence of hypoxia  Outcome: Progressing Towards Goal  Sustained o2 sat between 88-92% laying in bed at 2 liter. I feeel good. Being encouraged to use the IS frequentlyl. Demonstrated back well.     4:06 PM    Brought to Dr. Stella Rae attention of the elevated BP for the last hour or two though pt is asymptomatic, no order for now rather than keeping close monitoring.

## 2017-11-26 NOTE — PROGRESS NOTES
1900: Bedside and Verbal shift change report given to George Williamson (oncoming nurse) by Marny Frankel (offgoing nurse). Report included the following information SBAR, Kardex, ED Summary, Intake/Output, MAR, Accordion, Recent Results, Med Rec Status and Cardiac Rhythm NSR.     0710: Bedside and Verbal shift change report given to Marny Frankel (oncoming nurse) by George Williamson (offgoing nurse). Report included the following information SBAR, Kardex, ED Summary, Intake/Output, MAR, Accordion, Recent Results, Med Rec Status and Cardiac Rhythm NSR.

## 2017-11-26 NOTE — PROGRESS NOTES
[facility] [place] [facility]  [address]  [phone]      Medical Progress Note      NAME: Leatha Current   :  1980  MRM:  [id number]    Date/Time: 2017         Subjective:     Chief Complaint:  Patient was seen and examined by me. Chart reviewed    \"I'm okay\"     No complains. Tolerating biPAP. Antoinette Nunez for discharge        Objective:       Vitals:       Last 24hrs VS reviewed since prior progress note. Most recent are:    Visit Vitals    /89 (BP 1 Location: Right arm, BP Patient Position: Sitting; At rest)    Pulse 86    Temp 98.4 °F (36.9 °C)    Resp 18    Ht 5' 10\" (1.778 m)    Wt 99.5 kg (219 lb 5.7 oz)    SpO2 90%    BMI 31.47 kg/m2     SpO2 Readings from Last 6 Encounters:   17 90%    O2 Flow Rate (L/min): 2 l/min       Intake/Output Summary (Last 24 hours) at 17 1247  Last data filed at 17 0645   Gross per 24 hour   Intake              240 ml   Output             2100 ml   Net            -1860 ml        Exam:     Physical Exam:    Gen:  Disheveled, obese, NAD  HEENT:  Pink conjunctivae, PERRL, hearing intact to voice, on O2  Neck:  Supple, without masses, thyroid non-tender  Resp:  No accessory muscle use, CTAB  Card:  No murmurs, normal S1, S2 without thrills, trace edema  Abd:  Soft, non-tender, non-distended, normoactive bowel sounds are present  Musc:  No cyanosis or clubbing  Skin:  No rashes  Neuro:  Cranial nerves 3-12 are grossly intact, follows commands appropriately  Psych:  Good insight, oriented to person, place and time, alert    Medications Reviewed: (see below)    Lab Data Reviewed: (see below)    ______________________________________________________________________    Medications:     Current Facility-Administered Medications   Medication Dose Route Frequency    furosemide (LASIX) tablet 20 mg  20 mg Oral DAILY    insulin glargine (LANTUS) injection 15 Units  15 Units SubCUTAneous ACB    enoxaparin (LOVENOX) injection 40 mg  40 mg SubCUTAneous Q24H    sodium chloride (NS) flush 5-10 mL  5-10 mL IntraVENous Q8H    sodium chloride (NS) flush 5-10 mL  5-10 mL IntraVENous PRN    acetaminophen (TYLENOL) tablet 650 mg  650 mg Oral Q4H PRN    morphine injection 2 mg  2 mg IntraVENous Q4H PRN    naloxone (NARCAN) injection 0.4 mg  0.4 mg IntraVENous PRN    diphenhydrAMINE (BENADRYL) injection 12.5 mg  12.5 mg IntraVENous Q4H PRN    prochlorperazine (COMPAZINE) injection 5 mg  5 mg IntraVENous Q8H PRN    LORazepam (ATIVAN) tablet 1 mg  1 mg Oral BID PRN    insulin lispro (HUMALOG) injection   SubCUTAneous AC&HS    glucose chewable tablet 16 g  4 Tab Oral PRN    dextrose (D50W) injection syrg 12.5-25 g  12.5-25 g IntraVENous PRN    glucagon (GLUCAGEN) injection 1 mg  1 mg IntraMUSCular PRN    aspirin delayed-release tablet 81 mg  81 mg Oral DAILY    dilTIAZem CD (CARDIZEM CD) capsule 240 mg  240 mg Oral DAILY    gemfibrozil (LOPID) tablet 600 mg  600 mg Oral BID    metoprolol succinate (TOPROL-XL) XL tablet 50 mg  50 mg Oral DAILY          Lab Review:     Recent Labs      11/26/17   0650  11/24/17   0449   WBC  6.6  7.3   HGB  12.5  12.5   HCT  39.3  39.8   PLT  401*  403*     Recent Labs      11/26/17   0650  11/25/17   1400  11/25/17   0425  11/24/17   0449   NA  143  142  147*  146*   K  4.1  4.3  4.3  4.3   CL  105  104  108  108   CO2  33*  31  33*  33*   GLU  130*  261*  101*  210*   BUN  27*  29*  28*  39*   CREA  0.94  0.97  0.90  1.10   CA  8.9  8.2*  9.1  8.6   MG   --    --   1.9  2.1   PHOS   --   3.9   --   3.6   ALB   --   2.3*   --    --      Lab Results   Component Value Date/Time    Glucose (POC) 215 11/26/2017 11:51 AM    Glucose (POC) 120 11/26/2017 07:40 AM    Glucose (POC) 195 11/25/2017 09:16 PM    Glucose (POC) 231 11/25/2017 04:18 PM    Glucose (POC) 135 11/25/2017 12:06 PM          Acute on chronic resp failure/severe hypoxia/hypercapnea: O2 sat ~70% on RA. PCO2 72.   Likely due to severe CAROLIN (with CPAP non-compliance) and pulm HTN. CTA without PE. R heart cath => pulm HTN  -pulm on board  -continue biPAP qHS while in house   -CM trying to arrange home biPAP qHS     Acute CHF: diastolic, echo with EF 56-04%  -s/p diuresis which was stopped due to MANOHAR   -continue low dose lasix    MANOHAR: improved. ? etiology  -monitor with diuresis     Hyperkalemia: now improved. Due to MANOHAR.   S/p kayexalate x1  -monitor     HTN:   -on metoprolol and dilt    Type 2 diabetes mellitus with renal complication: W2M 98.4%  -ISS   -BG checks AC TID and qHS   -continue Lantus; uptitrate PRN     CAROLIN: severe  -home O2 and biPAP to be arranged     Total time spent with patient: 30 min                   Care Plan discussed with: Patient    Discussed:  Care Plan    Prophylaxis:  Lovenox    Disposition:   PT, OT, RN, home O2, BiPAP            ___________________________________________________    Attending Physician: Hilary Burnham MD

## 2017-11-27 VITALS
WEIGHT: 218.26 LBS | BODY MASS INDEX: 31.25 KG/M2 | RESPIRATION RATE: 20 BRPM | HEART RATE: 84 BPM | HEIGHT: 70 IN | DIASTOLIC BLOOD PRESSURE: 75 MMHG | OXYGEN SATURATION: 97 % | TEMPERATURE: 98.3 F | SYSTOLIC BLOOD PRESSURE: 114 MMHG

## 2017-11-27 LAB
ALBUMIN 24H MFR UR ELPH: 67.9 %
ALPHA1 GLOB 24H MFR UR ELPH: 1.1 %
ALPHA2 GLOB 24H MFR UR ELPH: 7.1 %
ANION GAP SERPL CALC-SCNC: 3 MMOL/L (ref 5–15)
B-GLOBULIN MFR UR ELPH: 10.8 %
BASOPHILS # BLD: 0 K/UL (ref 0–0.1)
BASOPHILS NFR BLD: 0 % (ref 0–1)
BUN SERPL-MCNC: 30 MG/DL (ref 6–20)
BUN/CREAT SERPL: 33 (ref 12–20)
C-ANCA TITR SER IF: NORMAL TITER
CALCIUM SERPL-MCNC: 8.9 MG/DL (ref 8.5–10.1)
CHLORIDE SERPL-SCNC: 105 MMOL/L (ref 97–108)
CO2 SERPL-SCNC: 35 MMOL/L (ref 21–32)
COLLECT DURATION TIME UR: 24 HR
CREAT SERPL-MCNC: 0.9 MG/DL (ref 0.7–1.3)
EOSINOPHIL # BLD: 0.3 K/UL (ref 0–0.4)
EOSINOPHIL NFR BLD: 5 % (ref 0–7)
ERYTHROCYTE [DISTWIDTH] IN BLOOD BY AUTOMATED COUNT: 13.2 % (ref 11.5–14.5)
GAMMA GLOB 24H MFR UR ELPH: 13.1 %
GBM IGG SER-ACNC: 4 UNITS (ref 0–20)
GLUCOSE BLD STRIP.AUTO-MCNC: 129 MG/DL (ref 65–100)
GLUCOSE BLD STRIP.AUTO-MCNC: 135 MG/DL (ref 65–100)
GLUCOSE BLD STRIP.AUTO-MCNC: 140 MG/DL (ref 65–100)
GLUCOSE SERPL-MCNC: 129 MG/DL (ref 65–100)
HCT VFR BLD AUTO: 39.6 % (ref 36.6–50.3)
HGB BLD-MCNC: 12.8 G/DL (ref 12.1–17)
INTERPRETATION UR IFE-IMP: ABNORMAL
LYMPHOCYTES # BLD: 2 K/UL (ref 0.8–3.5)
LYMPHOCYTES NFR BLD: 30 % (ref 12–49)
M PROTEIN 24H MFR UR ELPH: ABNORMAL %
MCH RBC QN AUTO: 26.8 PG (ref 26–34)
MCHC RBC AUTO-ENTMCNC: 32.3 G/DL (ref 30–36.5)
MCV RBC AUTO: 82.8 FL (ref 80–99)
MONOCYTES # BLD: 0.9 K/UL (ref 0–1)
MONOCYTES NFR BLD: 13 % (ref 5–13)
NEUTS SEG # BLD: 3.6 K/UL (ref 1.8–8)
NEUTS SEG NFR BLD: 52 % (ref 32–75)
NOTE, 149533: ABNORMAL
P-ANCA ATYPICAL TITR SER IF: NORMAL TITER
P-ANCA TITR SER IF: NORMAL TITER
PLATELET # BLD AUTO: 392 K/UL (ref 150–400)
POTASSIUM SERPL-SCNC: 3.9 MMOL/L (ref 3.5–5.1)
PROT 24H UR-MRATE: 3279 MG/24 HR (ref 30–150)
PROT UR-MCNC: 109.3 MG/DL
RBC # BLD AUTO: 4.78 M/UL (ref 4.1–5.7)
SERVICE CMNT-IMP: ABNORMAL
SODIUM SERPL-SCNC: 143 MMOL/L (ref 136–145)
SPECIMEN VOL ?TM UR: 3000 ML
WBC # BLD AUTO: 6.8 K/UL (ref 4.1–11.1)

## 2017-11-27 PROCEDURE — 94761 N-INVAS EAR/PLS OXIMETRY MLT: CPT

## 2017-11-27 PROCEDURE — 74011250637 HC RX REV CODE- 250/637: Performed by: INTERNAL MEDICINE

## 2017-11-27 PROCEDURE — 80048 BASIC METABOLIC PNL TOTAL CA: CPT | Performed by: INTERNAL MEDICINE

## 2017-11-27 PROCEDURE — 74011636637 HC RX REV CODE- 636/637: Performed by: INTERNAL MEDICINE

## 2017-11-27 PROCEDURE — 36415 COLL VENOUS BLD VENIPUNCTURE: CPT | Performed by: INTERNAL MEDICINE

## 2017-11-27 PROCEDURE — 77010033678 HC OXYGEN DAILY

## 2017-11-27 PROCEDURE — 82962 GLUCOSE BLOOD TEST: CPT

## 2017-11-27 PROCEDURE — 85025 COMPLETE CBC W/AUTO DIFF WBC: CPT | Performed by: INTERNAL MEDICINE

## 2017-11-27 RX ORDER — AMLODIPINE BESYLATE 5 MG/1
10 TABLET ORAL DAILY
Status: DISCONTINUED | OUTPATIENT
Start: 2017-11-27 | End: 2017-11-27 | Stop reason: HOSPADM

## 2017-11-27 RX ORDER — INSULIN GLARGINE 100 [IU]/ML
15 INJECTION, SOLUTION SUBCUTANEOUS DAILY
Qty: 1 VIAL | Refills: 1 | Status: SHIPPED | OUTPATIENT
Start: 2017-11-27

## 2017-11-27 RX ORDER — DILTIAZEM HYDROCHLORIDE 240 MG/1
240 CAPSULE, COATED, EXTENDED RELEASE ORAL DAILY
Status: DISCONTINUED | OUTPATIENT
Start: 2017-11-27 | End: 2017-11-27 | Stop reason: HOSPADM

## 2017-11-27 RX ADMIN — ASPIRIN 81 MG: 81 TABLET, COATED ORAL at 08:30

## 2017-11-27 RX ADMIN — GEMFIBROZIL 600 MG: 600 TABLET ORAL at 18:28

## 2017-11-27 RX ADMIN — GEMFIBROZIL 600 MG: 600 TABLET ORAL at 08:30

## 2017-11-27 RX ADMIN — AMLODIPINE BESYLATE 10 MG: 5 TABLET ORAL at 08:30

## 2017-11-27 RX ADMIN — INSULIN LISPRO 2 UNITS: 100 INJECTION, SOLUTION INTRAVENOUS; SUBCUTANEOUS at 16:30

## 2017-11-27 RX ADMIN — DILTIAZEM HYDROCHLORIDE 240 MG: 240 CAPSULE, COATED, EXTENDED RELEASE ORAL at 08:30

## 2017-11-27 RX ADMIN — Medication 10 ML: at 06:00

## 2017-11-27 RX ADMIN — METOPROLOL SUCCINATE 50 MG: 50 TABLET, FILM COATED, EXTENDED RELEASE ORAL at 08:29

## 2017-11-27 RX ADMIN — FUROSEMIDE 20 MG: 20 TABLET ORAL at 08:30

## 2017-11-27 RX ADMIN — INSULIN GLARGINE 15 UNITS: 100 INJECTION, SOLUTION SUBCUTANEOUS at 08:29

## 2017-11-27 NOTE — PROGRESS NOTES
Per Dr Coby Patel is for pt to be discharged today. My co-worker ,DARELL Obrien is working on disposition with pt.  6 M W order placed.     Candi Ganser  Team B  [phone]

## 2017-11-27 NOTE — CARDIO/PULMONARY
Cardiac Rehab: 11/27/2017 @ 1400: Following for HF education. Pt has Heart Failure education folder at bedside. Prior to admission cardiac meds include: Amlodipine, Lasix, metoprolol succinate, Lopid, diltiazem, ASA 81 mg. Smoking history assessed:  Never smoked. Pt has been non-receptive to education in the recent past.  However today he was pleasant and receptive. He listened to information. Pt lives with his Aunt in [place] and has been eager to return to [place]. He reported he has PCP, cardiologist and pulmonologist in [place] that he has seen routinely. He is reporting he has CPAP at home and has been using it their. His pulmonologist was helping him get it fix before he came down to [place] , however he did not bring his CPAP or have one with him. He is reporting he has an appt in [place] for PFT and sleep study testing. Educated using teach back method. Discussed diagnosis definition and assessed patient understanding. Pt is aware of hx of paHTN and the results of his right-heart cath. Reviewed right femoral instruction briefly as it has been a week since his heart cath. Reviewed importance of daily weight monitoring, Taking meds as prescribed, and Low Sodium diet (3072-8654 mg. daily). Pt indicated he does weigh himself at home. 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 Reviewed cardiac meds, purpose of medication, potential side effects, compliance, and what to do if dose is missed. Discussed importance of reporting signs and symptoms of exacerbation, and when to report them to the doctor, to prevent re-hospitalization. Myriam Pearson was encouraged to keep all appointments with doctor. Pt reported he will be leaving to return to [place] by this weekend. He will be taking the train to go home. Strongly encourage f/u with all aspects of his health care when he returns to [place].    Pt verbalized understanding of information provided and all question answered

## 2017-11-27 NOTE — DISCHARGE INSTRUCTIONS
Avoiding Triggers With Heart Failure: Care Instructions  Your Care Instructions    Triggers are anything that make your heart failure flare up. A flare-up is also called \"sudden heart failure\" or \"acute heart failure. \" When you have a flare-up, fluid builds up in your lungs, and you have problems breathing. You might need to go to the hospital. By watching for changes in your condition and avoiding triggers, you can prevent heart failure flare-ups. Follow-up care is a key part of your treatment and safety. Be sure to make and go to all appointments, and call your doctor if you are having problems. It's also a good idea to know your test results and keep a list of the medicines you take. How can you care for yourself at home? Watch for changes in your weight and condition  · Weigh yourself without clothing at the same time each day. Record your weight. Call your doctor if you have sudden weight gain, such as more than 2 to 3 pounds in a day or 5 pounds in a week. (Your doctor may suggest a different range of weight gain.) A sudden weight gain may mean that your heart failure is getting worse. · Keep a daily record of your symptoms. Write down any changes in how you feel, such as new shortness of breath, cough, or problems eating. Also record if your ankles are more swollen than usual and if you feel more tired than usual. Note anything that you ate or did that could have triggered these changes. Limit sodium  Sodium causes your body to hold on to extra water. This may cause your heart failure symptoms to get worse. People get most of their sodium from processed foods. Fast food and restaurant meals also tend to be very high in sodium. · Your doctor may suggest that you limit sodium to 2,000 milligrams (mg) a day or less. That is less than 1 teaspoon of salt a day, including all the salt you eat in cooking or in packaged foods. · Read food labels on cans and food packages.  They tell you how much sodium you get in one serving. Check the serving size. If you eat more than one serving, you are getting more sodium. · Be aware that sodium can come in forms other than salt, including monosodium glutamate (MSG), sodium citrate, and sodium bicarbonate (baking soda). MSG is often added to Asian food. You can sometimes ask for food without MSG or salt. · Slowly reducing salt will help you adjust to the taste. Take the salt shaker off the table. · Flavor your food with garlic, lemon juice, onion, vinegar, herbs, and spices instead of salt. Do not use soy sauce, steak sauce, onion salt, garlic salt, mustard, or ketchup on your food, unless it is labeled \"low-sodium\" or \"low-salt. \"  · Make your own salad dressings, sauces, and ketchup without adding salt. · Use fresh or frozen ingredients, instead of canned ones, whenever you can. Choose low-sodium canned goods. · Eat less processed food and food from restaurants, including fast food. Exercise as directed  Moderate, regular exercise is very good for your heart. It improves your blood flow and helps control your weight. But too much exercise can stress your heart and cause a heart failure flare-up. · Check with your doctor before you start an exercise program.  · Walking is an easy way to get exercise. Start out slowly. Gradually increase the length and pace of your walk. Swimming, riding a bike, and using a treadmill are also good forms of exercise. · When you exercise, watch for signs that your heart is working too hard. You are pushing yourself too hard if you cannot talk while you are exercising. If you become short of breath or dizzy or have chest pain, stop, sit down, and rest.  · Do not exercise when you do not feel well. Take medicines correctly  · Take your medicines exactly as prescribed. Call your doctor if you think you are having a problem with your medicine. · Make a list of all the medicines you take.  Include those prescribed to you by other doctors and any over-the-counter medicines, vitamins, or supplements you take. Take this list with you when you go to any doctor. · Take your medicines at the same time every day. It may help you to post a list of all the medicines you take every day and what time of day you take them. · Make taking your medicine as simple as you can. Plan times to take your medicines when you are doing other things, such as eating a meal or getting ready for bed. This will make it easier to remember to take your medicines. · Get organized. Use helpful tools, such as daily or weekly pill containers. When should you call for help? Call 911 if you have symptoms of sudden heart failure such as:  ? · You have severe trouble breathing. ? · You cough up pink, foamy mucus. ? · You have a new irregular or rapid heartbeat. ?Call your doctor now or seek immediate medical care if:  ? · You have new or increased shortness of breath. ? · You are dizzy or lightheaded, or you feel like you may faint. ? · You have sudden weight gain, such as more than 2 to 3 pounds in a day or 5 pounds in a week. (Your doctor may suggest a different range of weight gain.)   ? · You have increased swelling in your legs, ankles, or feet. ? · You are suddenly so tired or weak that you cannot do your usual activities. ? Watch closely for changes in your health, and be sure to contact your doctor if you develop new symptoms. Where can you learn more? Go to http://cristobal-jeanette.info/. Enter R271 in the search box to learn more about \"Avoiding Triggers With Heart Failure: Care Instructions. \"  Current as of: September 21, 2016  Content Version: 11.4  © 1617-4674 [employer]. Care instructions adapted under license by [employer] (which disclaims liability or warranty for this information).  If you have questions about a medical condition or this instruction, always ask your healthcare professional. Juan Antonio Esquivel disclaims any warranty or liability for your use of this information. Chronic Pulmonary Heart Disease: Care Instructions  Your Care Instructions    Your heart has four chambers. The lower right chamber, called the right ventricle, pumps blood to the lungs. Chronic pulmonary heart disease happens when the right ventricle has to work too hard to pump blood to lungs that have been damaged. The lungs may have been damaged by a condition like COPD (chronic obstructive pulmonary disease), blood clots in the lung, or sleep apnea. As time goes by, the right ventricle will get weaker and begin to fail. Over time, chronic pulmonary heart disease can cause fluid to build up in your body. This buildup can cause fatigue, swelling in the legs and body, and other problems. The damage to your lungs may cause shortness of breath. You can take steps to feel better and live longer with this disease. These steps include taking medicines regularly, getting oxygen therapy if your doctor recommends it, and making lifestyle changes. Follow-up care is a key part of your treatment and safety. Be sure to make and go to all appointments, and call your doctor if you are having problems. It's also a good idea to know your test results and keep a list of the medicines you take. How can you care for yourself at home? Medicine  · Your doctor may prescribe oxygen therapy to reduce your heart's workload and help you breathe easier. · Be safe with medicines. Take your medicines exactly as prescribed. Call your doctor if you think you are having a problem with your medicine. · Talk to your doctor before you take any vitamins, over-the-counter medicine, or herbal products. Don't take ibuprofen (Advil or Motrin) and naproxen (Aleve) without talking to your doctor first. They could make your heart problem worse. Weight  · Weigh yourself without clothing at the same time each day.  A sudden weight gain may mean that your heart problem is getting worse. Activity level  · If your doctor recommends it, get more exercise. Walking is a good choice. Bit by bit, increase the amount you walk every day. Try for at least 30 minutes on most days of the week. · Watch for signs that your heart is working too hard when you exercise. You are pushing yourself too hard if you cannot talk while you are exercising. If you become short of breath or dizzy or have chest pain, stop, sit down, and rest.  Lifestyle changes  · Do not smoke. Smoking can make heart disease worse. If you need help quitting, talk to your doctor about stop-smoking programs and medicines. These can increase your chances of quitting for good. Quitting smoking may be the most important step you can take to protect your heart. · Limit alcohol to 2 drinks a day for men and 1 drink a day for women. Too much alcohol can cause health problems. · Avoid colds and flu. Get a pneumococcal vaccine shot. If you have had one before, ask your doctor if you need a second dose. Get a flu vaccine every fall. If you must be around people with colds or flu, wash your hands often. When should you call for help? Call 911 anytime you think you may need emergency care. For example, call if:  ? · You have symptoms of sudden heart failure. These may include:  ¨ Severe trouble breathing. ¨ A fast or irregular heartbeat. ¨ Coughing up pink, foamy mucus. ? · You passed out (lost consciousness). ?Call your doctor now or seek immediate medical care if:  ? · You have new or worse trouble breathing. ? · You are dizzy or lightheaded, or you feel like you may faint. ? · You have sudden weight gain, such as more than 2 to 3 pounds in a day or 5 pounds in a week. (Your doctor may suggest a different range of weight gain.)   ? Watch closely for changes in your health, and be sure to contact your doctor if:  ? · You have new or worse swelling in your belly, legs, ankles, or feet. ? · You do not get better as expected. Where can you learn more? Go to http://cristobal-jeanette.info/. Enter T648 in the search box to learn more about \"Chronic Pulmonary Heart Disease: Care Instructions. \"  Current as of: September 21, 2016  Content Version: 11.4  © 4400-5421 [employer]. Care instructions adapted under license by [employer] (which disclaims liability or warranty for this information). If you have questions about a medical condition or this instruction, always ask your healthcare professional. [address] any warranty or liability for your use of this information.

## 2017-11-27 NOTE — PROGRESS NOTES
O2 6 minute walk test performed. Mr Phil Donaldson resting room air oxygen saturation was 88%. With exercise he dropped to 83% after 2 minutes on room air. Nasal O2 at 4 lpm was added. O2 saturation on 4 lpm nasal O2 with exercise increased to 94%. Post exercise he was tested at rest with  4 lpm nasal O2 and his saturation was 97%.

## 2017-11-27 NOTE — PROGRESS NOTES
Name: [facility]: [facility]   : 1980 Admit Date: 2017   Phone:   Room: 324/01   PCP: Hudson Augustine MD  MRN: [id number]   Date: 2017  Code: Full Code        Chart and notes reviewed. Data reviewed. I review the patient's current medications in the medical record at each encounter. I have evaluated and examined the patient. Overnight events  Afebrile  Sats 94% on 6L   HTN overnight, better this morning  No other issues overnight  1925 ml UOP (no intake documented)    ROS: Patient has no complaints this morning. Denies feeling SOB. Denies CP. Denies fever, chills, or cough. Denies abd pain or LE pain/swelling.       Current Facility-Administered Medications   Medication Dose Route Frequency    amLODIPine (NORVASC) tablet 10 mg  10 mg Oral DAILY    dilTIAZem CD (CARDIZEM CD) capsule 240 mg  240 mg Oral DAILY    furosemide (LASIX) tablet 20 mg  20 mg Oral DAILY    insulin glargine (LANTUS) injection 15 Units  15 Units SubCUTAneous ACB    enoxaparin (LOVENOX) injection 40 mg  40 mg SubCUTAneous Q24H    sodium chloride (NS) flush 5-10 mL  5-10 mL IntraVENous Q8H    sodium chloride (NS) flush 5-10 mL  5-10 mL IntraVENous PRN    acetaminophen (TYLENOL) tablet 650 mg  650 mg Oral Q4H PRN    morphine injection 2 mg  2 mg IntraVENous Q4H PRN    naloxone (NARCAN) injection 0.4 mg  0.4 mg IntraVENous PRN    diphenhydrAMINE (BENADRYL) injection 12.5 mg  12.5 mg IntraVENous Q4H PRN    prochlorperazine (COMPAZINE) injection 5 mg  5 mg IntraVENous Q8H PRN    LORazepam (ATIVAN) tablet 1 mg  1 mg Oral BID PRN    insulin lispro (HUMALOG) injection   SubCUTAneous AC&HS    glucose chewable tablet 16 g  4 Tab Oral PRN    dextrose (D50W) injection syrg 12.5-25 g  12.5-25 g IntraVENous PRN    glucagon (GLUCAGEN) injection 1 mg  1 mg IntraMUSCular PRN    aspirin delayed-release tablet 81 mg  81 mg Oral DAILY    gemfibrozil (LOPID) tablet 600 mg  600 mg Oral BID    metoprolol succinate (TOPROL-XL) XL tablet 50 mg  50 mg Oral DAILY         REVIEW OF SYSTEMS   12 point ROS negative except as stated in the HPI. Physical Exam:   Visit Vitals    /82 (BP 1 Location: Left arm, BP Patient Position: At rest)    Pulse 87    Temp 98.6 °F (37 °C)    Resp 21    Ht 5' 10\" (1.778 m)    Wt 99 kg (218 lb 4.1 oz)    SpO2 94%    BMI 31.32 kg/m2       General:  Alert, cooperative, no distress, appears stated age   Head:  Normocephalic, without obvious abnormality, atraumatic. Eyes:  Conjunctivae/corneas clear. Nose: Nares normal. Septum midline. Mucosa normal.    Throat: Lips, mucosa, and tongue normal.    Neck: Supple, symmetrical, trachea midline. Lungs:   Diminished, but clear to auscultation bilaterally. No wheeze or rales appreciated. Chest wall:  No tenderness or deformity. Heart:  Regular rhythm, no murmur, click, rub or gallop. Abdomen:   Soft, non-tender. Bowel sounds normal. No masses,  No organomegaly. Extremities: Extremities normal, atraumatic, no cyanosis, trace LE edema. Pulses: 2+ and symmetric all extremities. Skin: Skin color, texture, turgor normal. No rashes or lesions   Neurologic: Grossly nonfocal       Lab Results   Component Value Date/Time    Sodium 143 11/27/2017 01:14 AM    Potassium 3.9 11/27/2017 01:14 AM    Chloride 105 11/27/2017 01:14 AM    CO2 35 11/27/2017 01:14 AM    BUN 30 11/27/2017 01:14 AM    Creatinine 0.90 11/27/2017 01:14 AM    Glucose 129 11/27/2017 01:14 AM    Calcium 8.9 11/27/2017 01:14 AM    Magnesium 1.9 11/25/2017 04:25 AM    Phosphorus 3.9 11/25/2017 02:00 PM       Lab Results   Component Value Date/Time    WBC 6.8 11/27/2017 01:14 AM    HGB 12.8 11/27/2017 01:14 AM    PLATELET 896 28/54/0004 01:14 AM    MCV 82.8 11/27/2017 01:14 AM       Lab Results   Component Value Date/Time    AST (SGOT) 10 11/19/2017 02:50 AM    Alk.  phosphatase 73 11/19/2017 02:50 AM    Protein, total 7.6 11/19/2017 02:50 AM    Albumin 2.3 11/25/2017 02:00 PM    Globulin 5.2 11/19/2017 02:50 AM       No results found for: IRON, FE, TIBC, IBCT, PSAT, FERR    No results found for: SR, CRP, YASMIN, ANAIGG, RA, RPR, RPRT, VDRLT, VDRLS, TSH, TSHEXT, TSHEXT     No results found for: PH, PHI, PCO2, PCO2I, PO2, PO2I, HCO3, HCO3I, FIO2, FIO2I    Lab Results   Component Value Date/Time    Troponin-I, Qt. <0.04 11/19/2017 02:50 AM        No results found for: CULT    Lab Results   Component Value Date/Time    Hepatitis B surface Ag <0.10 11/21/2017 11:07 AM       No results found for: VANCT, CPK    Lab Results   Component Value Date/Time    Color YELLOW/STRAW 11/21/2017 10:50 AM    Appearance CLEAR 11/21/2017 10:50 AM    pH (UA) 5.5 11/21/2017 10:50 AM    Protein 100 11/21/2017 10:50 AM    Glucose NEGATIVE  11/21/2017 10:50 AM    Ketone NEGATIVE  11/21/2017 10:50 AM    Bilirubin NEGATIVE  11/21/2017 10:50 AM    Blood SMALL 11/21/2017 10:50 AM    Urobilinogen 0.2 11/21/2017 10:50 AM    Nitrites NEGATIVE  11/21/2017 10:50 AM    Leukocyte Esterase NEGATIVE  11/21/2017 10:50 AM    WBC 0-4 11/21/2017 10:50 AM    RBC 0-5 11/21/2017 10:50 AM    Bacteria NEGATIVE  11/21/2017 10:50 AM         Images: no new images this morning    Chest CTA (11/21/17): No PE. There is atelectasis in the lower lobes. The upper lung zones are clear. No pulm edema or pleural effusions.      IMPRESSION  · Chronic hypoxic respiratory failure  · Chronic hypercapnea  · Pulmonary HTN due to uncontrolled CAROLIN  · CAROLIN: Sleep study 2016 severe- AHI 50-60, desaturating to the 70s for extended amounts of time. · DM  · HTN      PLAN  · Continue O2 and wean as able to keep sats > 88% at baseline. Pt may have some VQ missmatch due to atx and would keep weaning if sats remain around 88-95% to get an idea of pts O2 requirement  · Pt will need NIPPV as outpt for his chronic CO2 retention and CAROLIN. Previous bipap settings 15/8.  His pulmonarty HTN mainly due to uncontrolled sleep apnea with some component of left sided heart failure. Long term may improve some if well managed. · Continue PO Lasix  · Rate control with metoprolol; also on Cardizem for HTN  · BiPAP nightly and with naps  · IS/OOB to chair  · CM working with [facility] on obtaining home BiPAP and portable O2 concentrator. Waiting in insurance auth may delay discharge,  · Needs complete PFTs outpatient  · DVT prophylaxis: sub q heparin    BiPAP ordered for pt for chronic respiratory failure due to severe CAROLIN. Previous setting 15/8 as outpt but will need O2 at night as well at this discharge.     Vic Commack, [place]

## 2017-11-27 NOTE — PROGRESS NOTES
I discussed pt with Angel who is assisting the hospitalist's teams today with this pt. .I will update attending @ our pm meeting. Ms Tee Comer plans to update her note regarding discharge planning.   Vee Kim  Team B

## 2017-11-27 NOTE — PROGRESS NOTES
NUTRITION    RECOMMENDATIONS:     1. Provide Diabetic Low Sodium diet   2. Encourage to follow ADA guidelines after discharge and low sodium diet for health    ASSESSMENT:   Patient seen d/t LOS. Admitted with SOB,  CHF, DM, HTN. Visiting from [place]. Visited pt who states he eats out often, at work will eat at a cafe or other restaurants. States he takes his diabetic medication at home. Reviewed his HgbA1C, pt is aware and verbalized he plans to make changes in his eating habits. I reviewed with him low sodium food products, diabetic meal plan, healthy sample meals and advised 3 meals/day. Pt receiving Lasix with known wt loss of fluid. Noted CHF packet in room with patient, denied need for other materials for diabetic diet. Verbalized greater than 2 times he plans to change his diet and eater mor healthy foods. Pt verbalized sample healthy meals.     Past Medical History:   Diagnosis Date    [facility]) [facility]     DM type 2 (diabetes mellitus, type 2) ([facility]     HTN (hypertension), benign     Hyperlipidemia     Sleep apnea        Diet: Cardiac    Abd: intact, active bs           BM: 11/26    Skin Integrity: [x]Intact  []Other  Edema: []None [x]Other: LLE: 1=; Non-pitting, RLE: 1+; Non-pitting    Nutritionally Significant Medications: [x] Reviewed & Includes: Norvasc, Cardizem, Lasix, Lantus, SSI    Labs:    Lab Results   Component Value Date/Time    Sodium 143 11/27/2017 01:14 AM    Potassium 3.9 11/27/2017 01:14 AM    Chloride 105 11/27/2017 01:14 AM    CO2 35 11/27/2017 01:14 AM    Anion gap 3 11/27/2017 01:14 AM    Glucose 129 11/27/2017 01:14 AM    BUN 30 11/27/2017 01:14 AM    Creatinine 0.90 11/27/2017 01:14 AM    Calcium 8.9 11/27/2017 01:14 AM    Magnesium 1.9 11/25/2017 04:25 AM    Phosphorus 3.9 11/25/2017 02:00 PM    Albumin 2.3 11/25/2017 02:00 PM       Anthropometrics:   Weight Source: Standing scale (comment) (11/27 2213)  Height: 5' 10\" (177.8 cm),    Body mass index is 31.32 kg/(m^2).   IBW : 75.3 kg (166 lb), % IBW (Calculated): 131.48 %, Usual Body Weight: 103 kg (227 lb) (last week per pt),    Wt Readings from Last 5 Encounters:   11/27/17 99 kg (218 lb 4.1 oz)       Estimated Daily Nutrition Requirements:   Weight Used: Actual wt  Kcals: 2490 Kcals/day (2000 for weight loss) Based on:[address] (x 1.3)  Protein: 79 g (0.8 gms/kg)   Fluid:  (1ml/kcal or per MD)      Education & Discharge Needs:   [] Pt discussed in ID rounds     Nutrition related discharge needs addressed:     [] Supplements (on d/c instruction &/or coupons provided)    [] Tube Feedings     [x] Education done   []No nutrition related discharge needs at this time     Cultural, Roman Catholic and ethnic food preferences identified    [x] None   [] Yes     NUTRITION DIAGNOSIS:     Undesirable food choices related to poor eating habits  as evidenced by eats fast food/restaurant foods often     Altered nutrition-related lab values related to  poorly controlled DM as evidenced by HgbA1C: 11.3%          Pt is at Nutrition Risk:  [x]    No Nutrition Risk Identified:  []    RD INTERVENTION / PT GOALS:     Food/Nutrient Delivery:   ,  ,  ,  ,    Nutrition Education:Initial/Brief Nutrition Education: Purpose of nutrition education (Low Sodium, Diabetic Diet ed),    Nutrition Counseling:    Coordination of Care:      Goal: Pt will have a good udnerstanding of ADA/Low sodium diet with resoruces for home use prior to discharge   Goal: Pt will work towards eating 3 meals/day/limiting fast food for BG control    MONITORING & EVALUATION:           Behavioral-Environmental Outcomes: Readiness to change, Food/nutrition knowledge  Previous Nutrition Goals:  Previous Goal Met: N/A  Previous Recommendations:      Previous Recommendations Implemented: N/A       Emy Herndon RD

## 2017-11-27 NOTE — PROGRESS NOTES
I discussed pt with Dr Chente Jose I am contacting DARELL Romero regarding attending's request to contact her regarding pt.   Chris Ventura

## 2017-11-27 NOTE — DISCHARGE SUMMARY
Physician Discharge Summary     Patient ID:  Ariana Campbell  [id number]  44 y.o.  1980    Admit date: 11/18/2017    Discharge date and time: 11/27/2017    Admission Diagnoses: SOB (shortness of breath)    Discharge Diagnoses/Hospital Course   Acute on chronic resp failure/severe hypoxia/hypercapnea: O2 sat ~70% on RA. PCO2 72. Likely due to severe CAROLIN (with CPAP non-compliance) and pulm HTN. CTA without PE. R heart cath => pulm HTN. Will need home biPAP which was arranged by CM      Acute CHF: diastolic, echo with EF 84-17%. Improved with diuresis. Will d/c home with PO diuretics    HTN: on metoprolol and dilt     Type 2 diabetes mellitus with renal complication: K6W 23.4%. On Lantus.      CAROLIN: severe. Home O2 and biPAP to be arranged     Of note, pt has been noncompliant with therapies in the past as well as noncompliant with O2 an medications     PCP: Meghna Other, MD     Consults: nephrology, pulmonary, cardiology    Discharge Exam:  Visit Vitals    /75 (BP 1 Location: Left arm, BP Patient Position: At rest)    Pulse 84    Temp 98.3 °F (36.8 °C)    Resp 20    Ht 5' 10\" (1.778 m)    Wt 99 kg (218 lb 4.1 oz)    SpO2 97%    BMI 31.32 kg/m2     Gen:  Disheveled, obese, NAD  HEENT:  Pink conjunctivae, PERRL, hearing intact to voice, on O2  Neck:  Supple, without masses, thyroid non-tender  Resp:  No accessory muscle use, CTAB  Card:  No murmurs, normal S1, S2 without thrills, trace edema  Abd:  Soft, non-tender, non-distended, normoactive bowel sounds are present  Musc:  No cyanosis or clubbing  Skin:  No rashes  Neuro:  Cranial nerves 3-12 are grossly intact, follows commands appropriately  Psych:  Good insight, oriented to person, place and time, aler    Disposition: home    Patient Instructions:   Current Discharge Medication List      START taking these medications    Details   insulin glargine (LANTUS) 100 unit/mL injection 15 Units by SubCUTAneous route daily.   Qty: 1 Vial, Refills: 1 CONTINUE these medications which have NOT CHANGED    Details   metoprolol succinate (TOPROL-XL) 50 mg XL tablet Take 50 mg by mouth daily. amLODIPine (NORVASC) 10 mg tablet Take 10 mg by mouth daily. gemfibrozil (LOPID) 600 mg tablet Take 600 mg by mouth two (2) times a day. metFORMIN (GLUCOPHAGE) 1,000 mg tablet Take 1,000 mg by mouth two (2) times daily (with meals). dilTIAZem CD (CARDIZEM CD) 240 mg ER capsule Take 240 mg by mouth daily. furosemide (LASIX) 40 mg tablet Take 40 mg by mouth two (2) times a day. aspirin delayed-release 81 mg tablet Take 81 mg by mouth daily.            Activity: As tolerated   Diet: Diabetic   Follow-up Information     Follow up With Details Comments Contact Info    Phys Other, MD   Patient can only remember the practice name and not the physician            Approximate time spent in patient care on day of discharge: 35 minutes     Signed:  Shahid Edwards MD  11/27/2017  4:38 PM

## 2017-11-27 NOTE — PROGRESS NOTES
Owen Carty updated me that [employer] is still working on preauthorization of BiPaP. She is planning to call the [employer] rep from home and will contact the hospital once she has an answer regarding delivery of BiPaP.     [address] [facility]  Team B

## 2017-11-27 NOTE — DIABETES MGMT
Progress Note     Chart reviewed for elevated blood glucose ( > 180 mg/dL x 2 in the past 24 hours) . Recommendations/ Comments: If appropriate, please consider adding diabetic restrictions to current diet order to aid in managing postprandial glucose. Fasting glucose today: 129 mg/dL (per am lav). Required 6 units of correction in the last 24 hours. Inpatient medications for glucose management:  1. Correction Scale: Lispro (Humalog) Normal Sensitivity scale to cover for glucose > 139 mg/dL before meals and for glucose >199 at bedtime      2. Lantus 15 units before breakfast     POC Glucose last 24hrs:   Lab Results   Component Value Date/Time     (H) 11/27/2017 01:14 AM    GLUCPOC 135 (H) 11/27/2017 11:16 AM    GLUCPOC 129 (H) 11/27/2017 07:17 AM    GLUCPOC 150 (H) 11/26/2017 08:44 PM        Estimated Creatinine Clearance: 131.3 mL/min (based on Cr of 0.9). Diet order:   Active Orders   Diet    DIET CARDIAC Regular      PO intake: Patient Vitals for the past 72 hrs:   % Diet Eaten   11/27/17 0821 90 %   11/24/17 1221 100 %       History of Diabetes:   Ian Paredes is a 44 y.o. male with a past medical history significant for DM per Dr. Jasmyne Childress MD's H&P dated 11/18/2017. Prior to admission medications for glucose management: per past medical records  -Lantus 54 units daily   -Metformin 1000mg twice a day with meals     A1C:   Lab Results   Component Value Date/Time    Hemoglobin A1c 11.3 11/18/2017 02:11 PM       Reference range*:  Increased risk for diabetes: 5.7 - 6.4%  Diabetes: >6.4%  Glycemic control for adults with diabetes: <7.0 %    *CAROLYNN HUTCHINSON (2014). Diagnosis and classification of diabetes mellitus. Diabetes care, 37, S81. Thank you. Gabi Contreras.  Yonatan MPH, RN, BSN, [address]   [phone]    -For most hospitalized persons with hyperglycemia in the intensive care unit (ICU), a glucose range of 140 to 180 mg/dL is recommended, provided this target can be safely achieved. *  - For general medicine and surgery patients in non-ICU settings, a premeal glucose target <140 mg/dL and a random blood glucose <180 mg/dL are recommended. *    RYAN Soliz, Lane Caceres., Petrona Elena., ... & Taylor Aguilar ([id number]). AMERICAN ASSOCIATION OF CLINICAL ENDOCRINOLOGISTS AND AMERICAN COLLEGE OF ENDOCRINOLOGY-CLINICAL PRACTICE GUIDELINES FOR DEVELOPING A DIABETES MELLITUS COMPREHENSIVE CARE PLAN-2015-EXECUTIVE SUMMARY: Complete guidelines are available at https://www. aace. com/publications/guidelines. Endocrine Practice, 21(4), L2096015.

## 2017-11-27 NOTE — PROGRESS NOTES
[facility]  [address] [facility], [place], [address] [place] [address]  [phone]      Medical Progress Note      NAME: Kapil Wong   :  1980  MRM:  [id number]    Date/Time: 2017         Subjective:     Chief Complaint:  Patient was seen and examined by me. Chart reviewed    \"I'm okay\"     No complaints. Graylin Alter for discharge        Objective:       Vitals:       Last 24hrs VS reviewed since prior progress note.  Most recent are:    Visit Vitals    /75 (BP 1 Location: Left arm, BP Patient Position: At rest)    Pulse 84    Temp 98.3 °F (36.8 °C)    Resp 20    Ht 5' 10\" (1.778 m)    Wt 99 kg (218 lb 4.1 oz)    SpO2 97%    BMI 31.32 kg/m2     SpO2 Readings from Last 6 Encounters:   17 97%    O2 Flow Rate (L/min): 2 l/min       Intake/Output Summary (Last 24 hours) at 17 1639  Last data filed at 17 1010   Gross per 24 hour   Intake              240 ml   Output             1950 ml   Net            -1710 ml        Exam:     Physical Exam:    Gen:  Disheveled, obese, NAD  HEENT:  Pink conjunctivae, PERRL, hearing intact to voice, on O2  Neck:  Supple, without masses, thyroid non-tender  Resp:  No accessory muscle use, CTAB  Card:  No murmurs, normal S1, S2 without thrills, trace edema  Abd:  Soft, non-tender, non-distended, normoactive bowel sounds are present  Musc:  No cyanosis or clubbing  Skin:  No rashes  Neuro:  Cranial nerves 3-12 are grossly intact, follows commands appropriately  Psych:  Good insight, oriented to person, place and time, alert    Medications Reviewed: (see below)    Lab Data Reviewed: (see below)    ______________________________________________________________________    Medications:     Current Facility-Administered Medications   Medication Dose Route Frequency    amLODIPine (NORVASC) tablet 10 mg  10 mg Oral DAILY    dilTIAZem CD (CARDIZEM CD) capsule 240 mg  240 mg Oral DAILY    furosemide (LASIX) tablet 20 mg  20 mg Oral DAILY  insulin glargine (LANTUS) injection 15 Units  15 Units SubCUTAneous ACB    enoxaparin (LOVENOX) injection 40 mg  40 mg SubCUTAneous Q24H    sodium chloride (NS) flush 5-10 mL  5-10 mL IntraVENous Q8H    sodium chloride (NS) flush 5-10 mL  5-10 mL IntraVENous PRN    acetaminophen (TYLENOL) tablet 650 mg  650 mg Oral Q4H PRN    morphine injection 2 mg  2 mg IntraVENous Q4H PRN    naloxone (NARCAN) injection 0.4 mg  0.4 mg IntraVENous PRN    diphenhydrAMINE (BENADRYL) injection 12.5 mg  12.5 mg IntraVENous Q4H PRN    prochlorperazine (COMPAZINE) injection 5 mg  5 mg IntraVENous Q8H PRN    LORazepam (ATIVAN) tablet 1 mg  1 mg Oral BID PRN    insulin lispro (HUMALOG) injection   SubCUTAneous AC&HS    glucose chewable tablet 16 g  4 Tab Oral PRN    dextrose (D50W) injection syrg 12.5-25 g  12.5-25 g IntraVENous PRN    glucagon (GLUCAGEN) injection 1 mg  1 mg IntraMUSCular PRN    aspirin delayed-release tablet 81 mg  81 mg Oral DAILY    gemfibrozil (LOPID) tablet 600 mg  600 mg Oral BID    metoprolol succinate (TOPROL-XL) XL tablet 50 mg  50 mg Oral DAILY          Lab Review:     Recent Labs      11/27/17   0114  11/26/17   0650   WBC  6.8  6.6   HGB  12.8  12.5   HCT  39.6  39.3   PLT  392  401*     Recent Labs      11/27/17   0114  11/26/17   0650  11/25/17   1400  11/25/17   0425   NA  143  143  142  147*   K  3.9  4.1  4.3  4.3   CL  105  105  104  108   CO2  35*  33*  31  33*   GLU  129*  130*  261*  101*   BUN  30*  27*  29*  28*   CREA  0.90  0.94  0.97  0.90   CA  8.9  8.9  8.2*  9.1   MG   --    --    --   1.9   PHOS   --    --   3.9   --    ALB   --    --   2.3*   --      Lab Results   Component Value Date/Time    Glucose (POC) 140 11/27/2017 04:14 PM    Glucose (POC) 135 11/27/2017 11:16 AM    Glucose (POC) 129 11/27/2017 07:17 AM    Glucose (POC) 150 11/26/2017 08:44 PM    Glucose (POC) 236 11/26/2017 04:19 PM          Acute on chronic resp failure/severe hypoxia/hypercapnea: O2 sat ~70% on RA.  PCO2 72. Likely due to severe CAROLIN (with CPAP non-compliance) and pulm HTN. CTA without PE. R heart cath => pulm HTN  -pulm on board  -continue biPAP qHS while in house   -CM trying to arrange home biPAP qHS     Acute CHF: diastolic, echo with EF 53-07%  -s/p diuresis which was stopped due to MANOHAR   -resume home lasix at discharge     MANOHAR: improved. ? etiology  -monitor with diuresis     Hyperkalemia: now improved. Due to MANOHAR.   S/p kayexalate x1    HTN:   -on metoprolol and dilt    Type 2 diabetes mellitus with renal complication: G0H 57.6%  -ISS   -BG checks AC TID and qHS   -continue Lantus; uptitrate PRN     CAROLIN: severe  -home O2 and biPAP to be arranged     Total time spent with patient: 32 min                   Care Plan discussed with: Patient    Discussed:  Care Plan    Prophylaxis:  Lovenox    Disposition:   PT, OT, RN, home O2, BiPAP            ___________________________________________________    Attending Physician: Cher Stovall MD

## 2017-11-27 NOTE — PROGRESS NOTES
Bedside and Verbal shift change report given to Brandon Hernandez (oncoming nurse) by Bertram Anderson (offgoing nurse). Report included the following information SBAR, Kardex, ED Summary, Intake/Output, MAR, Accordion, Recent Results, Med Rec Status and Cardiac Rhythm NSR.

## 2017-11-27 NOTE — PROGRESS NOTES
4:04PM  CM received a call from Yeyo at [facility]. Still waiting for authorization on Trilogy machine but will discontinue and will get authorization for Bipap instead. Waiting to finish authorization for O2, will bring portable O2 to hospital and finish set up at his aunt's house. CM will continue to follow and update Dr of progress of obtaining machine for pt. Juan Argueta, MSW    2:00pm    CM received order for O2 and sent order to [facility]. Spoke to pt regarding machine and O2. Pt stated he will not take the O2 home with him to [place]. He will only take it to his aunt's house. He said, \"I will be fine without it\" (Meaning on his trip on the train back to [place]). Pt said he would stay at his aunt's house until Friday. Discussed with him the need to use his O2 but he feels he will be fine on the trip. Pt would like the machine delivered to the hospital.  Mickey Deidreayama back to let her know he would like the machine delivered to the hospital.  Also discussed O2. She said the O2 would be delivered to his aunt's house. Will continue to follow. Juan Argueta, MSW      1:45pm  Spoke to Yeyo at [facility] regarding the equipment for pt. [facility] is still waiting for authorization for either the Bipap machine or the Trilogy machine. Discussed pt needing O2, will send order over when received. Yeyo asked CM to find out if pt wants the Bipap machine sent to the hospital or his aunt's house. Will meet with pt. Juan Argueta, MSW    9:31am  Spoke to [facility] to follow up with obtaining a Bipap machine. Yeyo said she needed the order written with the settings. Called Mc Reza (nurse) to ask to obtain the order. She will work on getting the order and settings. Received a call back from Yeyo who said they are still working on obtaining approval for a Trilogy machine and needs the usage sent over. Called back to speak to Mc Reza and let her know we would need the usage to send over to [facility]. She is working on it.   Will continue to follow. FLORIDALMA Chase CM reviewed previous CM notes. Met with pt who expressed he is anxious to discharge and needs to get home to [place]. Pt stated he could stay with his aunt until the end of the week but needs to get home. His aunt would be able to provide transportation to her home or he would call for an uber. Discussed portable O2 dilemma and pt said \"I will be ok\" meaning he would be ok without O2 to get home. CM spoke to nurse, who reported they were working on weaning him from O2 but when she saw him he had 6 liters of O2 back on and she decreased it to 4 liters. She said she would hold off from ordering O2 because the goal is to wean him off. The nurse said she did not hear that he was discharging today but would speak to Dr. Osvaldo Barnett. CM will call Pasha to follow up to order the BiPap machine.   FLORIDALMA Chase

## 2017-11-28 ENCOUNTER — TELEPHONE (OUTPATIENT)
Dept: CASE MANAGEMENT | Age: 37
End: 2017-11-28

## 2017-11-28 NOTE — PROGRESS NOTES
Shift Summary  11/27/2017  [id number]    Pt asleep at time of bedside shift report received from Pru RN. AM vitals, assessment, and meds complete without difficulty, AM rhythm strip shows NSR. Reviewed today's plan of care and goals with patient/family; plan of care today includes monitor respiratory status, safety, discharge planning. Pt has been discharged as of 1746 pending delivery of oxygen and home bipap machine. 1830 Reviewed discharge instructions with patient as oxygen and bipap machine have arrived. Patient states that he needs norvasc and metoprolol new script; spoke w/Dr. Zayda Orantes and entered a local pharmacy as patient's preferred pharmacy for her to call these into per her request. Upon returning to room to inform patient that these Rx's were being called in, he then requested a work note. I informed him that Dr. Zayda Orantes was no longer in-house and that we would not be able to provide a work note at this time. I have emailed this request to Jazmin Johnson and Val Escobar with CM for them to follow up tomorrow morning. Instructed patient to call when his ride arrived. [id number] Patient walked self off of unit without notifying staff.

## 2017-11-28 NOTE — PROGRESS NOTES
Left message at cellphone number for patient to come back to unit and  oxygen tank that was delivered for him. He was discharged yesterday, 11/27/17, but did not take his O2 tank with him.

## 2017-11-28 NOTE — PROGRESS NOTES
11- CASE MANAGEMENT NOTE:  Olivier Mejia, RN received a call from the pt stating he had gone to the pharmacy, [facility] on [id number] [facility] ([phone]), to  the prescriptions supposedly called in by Dr. Rafa Mortensen prior to his discharge last evening and was told there were no prescriptions called in. I spoke with a staff member at the pharmacy and confirmed that they had not received called in prescriptions for this pt. Since Dr. Rafa Mortensen is not working today, I spoke with Dr. Rosa Chauhan who took over for her and he was willing to call in the discharge prescriptions. I spoke with both the pt and his aunt with whom he is staying, Katrina Mack ([phone]), and told them the medications should be ready in about an hour. The aunt told me that when the pt came to visit, 2 weeks ago, he did not bring any insulin with him so the pt has not been compliant with his medications. Hopefully the pt will agree to stay here so his aunt can keep an eye on him.  Babak Palmer, BSW, CM